# Patient Record
Sex: FEMALE | Race: WHITE | Employment: UNEMPLOYED | URBAN - METROPOLITAN AREA
[De-identification: names, ages, dates, MRNs, and addresses within clinical notes are randomized per-mention and may not be internally consistent; named-entity substitution may affect disease eponyms.]

---

## 2017-01-20 ENCOUNTER — OFFICE VISIT (OUTPATIENT)
Dept: PULMONOLOGY | Age: 52
End: 2017-01-20

## 2017-01-20 VITALS
HEART RATE: 83 BPM | BODY MASS INDEX: 34.66 KG/M2 | HEIGHT: 64 IN | WEIGHT: 203 LBS | OXYGEN SATURATION: 98 % | DIASTOLIC BLOOD PRESSURE: 78 MMHG | RESPIRATION RATE: 16 BRPM | SYSTOLIC BLOOD PRESSURE: 112 MMHG

## 2017-01-20 DIAGNOSIS — J45.30 MILD PERSISTENT ASTHMA WITHOUT COMPLICATION: ICD-10-CM

## 2017-01-20 DIAGNOSIS — J30.89 PERENNIAL ALLERGIC RHINITIS, UNSPECIFIED ALLERGIC RHINITIS TRIGGER: Primary | ICD-10-CM

## 2017-01-20 DIAGNOSIS — R05.3 CHRONIC COUGH: ICD-10-CM

## 2017-01-20 PROCEDURE — 99213 OFFICE O/P EST LOW 20 MIN: CPT | Performed by: INTERNAL MEDICINE

## 2017-02-09 ENCOUNTER — OFFICE VISIT (OUTPATIENT)
Dept: FAMILY MEDICINE CLINIC | Age: 52
End: 2017-02-09

## 2017-02-09 VITALS
DIASTOLIC BLOOD PRESSURE: 68 MMHG | RESPIRATION RATE: 16 BRPM | WEIGHT: 199 LBS | HEART RATE: 88 BPM | BODY MASS INDEX: 34.16 KG/M2 | SYSTOLIC BLOOD PRESSURE: 100 MMHG | TEMPERATURE: 98.6 F

## 2017-02-09 DIAGNOSIS — J01.10 ACUTE FRONTAL SINUSITIS, RECURRENCE NOT SPECIFIED: Primary | ICD-10-CM

## 2017-02-09 DIAGNOSIS — J45.30 MILD PERSISTENT ASTHMA WITHOUT COMPLICATION: ICD-10-CM

## 2017-02-09 DIAGNOSIS — J30.89 PERENNIAL ALLERGIC RHINITIS, UNSPECIFIED ALLERGIC RHINITIS TRIGGER: ICD-10-CM

## 2017-02-09 PROCEDURE — 99214 OFFICE O/P EST MOD 30 MIN: CPT | Performed by: FAMILY MEDICINE

## 2017-02-09 RX ORDER — AMOXICILLIN AND CLAVULANATE POTASSIUM 875; 125 MG/1; MG/1
1 TABLET, FILM COATED ORAL EVERY 12 HOURS
Qty: 20 TABLET | Refills: 0 | Status: SHIPPED | OUTPATIENT
Start: 2017-02-09 | End: 2017-02-19

## 2017-02-09 RX ORDER — FLUTICASONE PROPIONATE 50 MCG
SPRAY, SUSPENSION (ML) NASAL
Qty: 3 BOTTLE | Refills: 3 | Status: SHIPPED | OUTPATIENT
Start: 2017-02-09

## 2017-02-09 RX ORDER — MONTELUKAST SODIUM 10 MG/1
TABLET ORAL
Qty: 90 TABLET | Refills: 3 | Status: SHIPPED | OUTPATIENT
Start: 2017-02-09 | End: 2018-07-11 | Stop reason: SDUPTHER

## 2017-02-13 ENCOUNTER — PATIENT MESSAGE (OUTPATIENT)
Dept: FAMILY MEDICINE CLINIC | Age: 52
End: 2017-02-13

## 2017-02-13 RX ORDER — AZITHROMYCIN 250 MG/1
TABLET, FILM COATED ORAL
Qty: 1 PACKET | Refills: 0 | Status: SHIPPED | OUTPATIENT
Start: 2017-02-13 | End: 2017-02-23

## 2017-04-18 ENCOUNTER — OFFICE VISIT (OUTPATIENT)
Dept: PULMONOLOGY | Age: 52
End: 2017-04-18

## 2017-04-18 VITALS
HEIGHT: 64 IN | HEART RATE: 80 BPM | WEIGHT: 201 LBS | OXYGEN SATURATION: 99 % | RESPIRATION RATE: 16 BRPM | BODY MASS INDEX: 34.31 KG/M2 | DIASTOLIC BLOOD PRESSURE: 82 MMHG | SYSTOLIC BLOOD PRESSURE: 112 MMHG

## 2017-04-18 DIAGNOSIS — J30.89 PERENNIAL ALLERGIC RHINITIS, UNSPECIFIED ALLERGIC RHINITIS TRIGGER: ICD-10-CM

## 2017-04-18 DIAGNOSIS — R05.3 CHRONIC COUGH: Primary | ICD-10-CM

## 2017-04-18 DIAGNOSIS — J45.30 MILD PERSISTENT ASTHMA WITHOUT COMPLICATION: ICD-10-CM

## 2017-04-18 PROCEDURE — 99213 OFFICE O/P EST LOW 20 MIN: CPT | Performed by: INTERNAL MEDICINE

## 2017-04-24 ENCOUNTER — OFFICE VISIT (OUTPATIENT)
Dept: FAMILY MEDICINE CLINIC | Age: 52
End: 2017-04-24

## 2017-04-24 VITALS
OXYGEN SATURATION: 98 % | HEART RATE: 82 BPM | DIASTOLIC BLOOD PRESSURE: 64 MMHG | WEIGHT: 199.8 LBS | BODY MASS INDEX: 34.3 KG/M2 | SYSTOLIC BLOOD PRESSURE: 116 MMHG

## 2017-04-24 DIAGNOSIS — R42 VERTIGO: Primary | ICD-10-CM

## 2017-04-24 PROCEDURE — 99213 OFFICE O/P EST LOW 20 MIN: CPT | Performed by: FAMILY MEDICINE

## 2017-04-24 RX ORDER — MECLIZINE HCL 12.5 MG/1
12.5-25 TABLET ORAL 3 TIMES DAILY PRN
Qty: 30 TABLET | Refills: 1 | Status: SHIPPED | OUTPATIENT
Start: 2017-04-24 | End: 2017-05-04

## 2018-01-04 ENCOUNTER — OFFICE VISIT (OUTPATIENT)
Dept: FAMILY MEDICINE CLINIC | Age: 53
End: 2018-01-04

## 2018-01-04 VITALS
RESPIRATION RATE: 20 BRPM | TEMPERATURE: 98.6 F | DIASTOLIC BLOOD PRESSURE: 68 MMHG | WEIGHT: 195.5 LBS | OXYGEN SATURATION: 98 % | HEART RATE: 81 BPM | BODY MASS INDEX: 33.56 KG/M2 | SYSTOLIC BLOOD PRESSURE: 132 MMHG

## 2018-01-04 DIAGNOSIS — J45.41 MODERATE PERSISTENT ASTHMA WITH EXACERBATION: ICD-10-CM

## 2018-01-04 DIAGNOSIS — R21 RASH: ICD-10-CM

## 2018-01-04 DIAGNOSIS — J01.00 ACUTE MAXILLARY SINUSITIS, RECURRENCE NOT SPECIFIED: Primary | ICD-10-CM

## 2018-01-04 PROCEDURE — 99214 OFFICE O/P EST MOD 30 MIN: CPT | Performed by: FAMILY MEDICINE

## 2018-01-04 RX ORDER — ALBUTEROL SULFATE 90 UG/1
2 AEROSOL, METERED RESPIRATORY (INHALATION) EVERY 6 HOURS PRN
Qty: 1 INHALER | Refills: 3 | Status: SHIPPED | OUTPATIENT
Start: 2018-01-04 | End: 2018-07-10 | Stop reason: SDUPTHER

## 2018-01-04 RX ORDER — AMOXICILLIN AND CLAVULANATE POTASSIUM 875; 125 MG/1; MG/1
1 TABLET, FILM COATED ORAL EVERY 12 HOURS
Qty: 20 TABLET | Refills: 0 | Status: SHIPPED | OUTPATIENT
Start: 2018-01-04 | End: 2018-01-04 | Stop reason: SINTOL

## 2018-01-04 RX ORDER — METHYLPREDNISOLONE 4 MG/1
TABLET ORAL
Qty: 1 KIT | Refills: 0 | Status: SHIPPED | OUTPATIENT
Start: 2018-01-04 | End: 2018-01-10

## 2018-04-10 ENCOUNTER — OFFICE VISIT (OUTPATIENT)
Dept: FAMILY MEDICINE CLINIC | Age: 53
End: 2018-04-10

## 2018-04-10 VITALS
HEART RATE: 94 BPM | OXYGEN SATURATION: 100 % | TEMPERATURE: 98.9 F | WEIGHT: 196.4 LBS | DIASTOLIC BLOOD PRESSURE: 84 MMHG | RESPIRATION RATE: 16 BRPM | SYSTOLIC BLOOD PRESSURE: 128 MMHG | BODY MASS INDEX: 33.71 KG/M2

## 2018-04-10 DIAGNOSIS — J02.9 SORE THROAT: Primary | ICD-10-CM

## 2018-04-10 DIAGNOSIS — Z12.11 COLON CANCER SCREENING: ICD-10-CM

## 2018-04-10 DIAGNOSIS — J01.00 ACUTE NON-RECURRENT MAXILLARY SINUSITIS: ICD-10-CM

## 2018-04-10 LAB — S PYO AG THROAT QL: NORMAL

## 2018-04-10 PROCEDURE — 87880 STREP A ASSAY W/OPTIC: CPT | Performed by: FAMILY MEDICINE

## 2018-04-10 PROCEDURE — 99213 OFFICE O/P EST LOW 20 MIN: CPT | Performed by: FAMILY MEDICINE

## 2018-04-10 RX ORDER — CEFUROXIME AXETIL 500 MG/1
500 TABLET ORAL 2 TIMES DAILY
Qty: 20 TABLET | Refills: 0 | Status: SHIPPED | OUTPATIENT
Start: 2018-04-10 | End: 2018-04-20

## 2018-04-12 ENCOUNTER — PATIENT MESSAGE (OUTPATIENT)
Dept: FAMILY MEDICINE CLINIC | Age: 53
End: 2018-04-12

## 2018-04-12 RX ORDER — DOXYCYCLINE HYCLATE 100 MG/1
100 CAPSULE ORAL 2 TIMES DAILY
Qty: 20 CAPSULE | Refills: 0 | Status: SHIPPED | OUTPATIENT
Start: 2018-04-12 | End: 2018-04-22

## 2018-05-07 ENCOUNTER — OFFICE VISIT (OUTPATIENT)
Dept: FAMILY MEDICINE CLINIC | Age: 53
End: 2018-05-07

## 2018-05-07 VITALS
TEMPERATURE: 98.5 F | DIASTOLIC BLOOD PRESSURE: 58 MMHG | OXYGEN SATURATION: 98 % | BODY MASS INDEX: 32.99 KG/M2 | WEIGHT: 192.2 LBS | SYSTOLIC BLOOD PRESSURE: 121 MMHG | RESPIRATION RATE: 12 BRPM | HEART RATE: 85 BPM

## 2018-05-07 DIAGNOSIS — J30.1 CHRONIC SEASONAL ALLERGIC RHINITIS DUE TO POLLEN: ICD-10-CM

## 2018-05-07 DIAGNOSIS — Z13.1 DIABETES MELLITUS SCREENING: ICD-10-CM

## 2018-05-07 DIAGNOSIS — Z13.220 LIPID SCREENING: ICD-10-CM

## 2018-05-07 DIAGNOSIS — J01.00 ACUTE NON-RECURRENT MAXILLARY SINUSITIS: Primary | ICD-10-CM

## 2018-05-07 DIAGNOSIS — J45.30 MILD PERSISTENT ASTHMA WITHOUT COMPLICATION: ICD-10-CM

## 2018-05-07 DIAGNOSIS — Z13.29 THYROID DISORDER SCREEN: ICD-10-CM

## 2018-05-07 DIAGNOSIS — E55.9 VITAMIN D DEFICIENCY: ICD-10-CM

## 2018-05-07 PROCEDURE — 99214 OFFICE O/P EST MOD 30 MIN: CPT | Performed by: FAMILY MEDICINE

## 2018-05-07 ASSESSMENT — PATIENT HEALTH QUESTIONNAIRE - PHQ9
SUM OF ALL RESPONSES TO PHQ QUESTIONS 1-9: 0
2. FEELING DOWN, DEPRESSED OR HOPELESS: 0
SUM OF ALL RESPONSES TO PHQ9 QUESTIONS 1 & 2: 0
1. LITTLE INTEREST OR PLEASURE IN DOING THINGS: 0

## 2018-05-08 DIAGNOSIS — Z13.220 LIPID SCREENING: ICD-10-CM

## 2018-05-08 DIAGNOSIS — Z13.29 THYROID DISORDER SCREEN: ICD-10-CM

## 2018-05-08 DIAGNOSIS — J30.1 CHRONIC SEASONAL ALLERGIC RHINITIS DUE TO POLLEN: ICD-10-CM

## 2018-05-08 DIAGNOSIS — Z13.1 DIABETES MELLITUS SCREENING: ICD-10-CM

## 2018-05-08 DIAGNOSIS — E55.9 VITAMIN D DEFICIENCY: ICD-10-CM

## 2018-05-08 LAB
A/G RATIO: 1.5 (ref 1.1–2.2)
ALBUMIN SERPL-MCNC: 4.1 G/DL (ref 3.4–5)
ALP BLD-CCNC: 90 U/L (ref 40–129)
ALT SERPL-CCNC: 16 U/L (ref 10–40)
ANION GAP SERPL CALCULATED.3IONS-SCNC: 14 MMOL/L (ref 3–16)
AST SERPL-CCNC: 11 U/L (ref 15–37)
BASOPHILS ABSOLUTE: 0 K/UL (ref 0–0.2)
BASOPHILS RELATIVE PERCENT: 0.3 %
BILIRUB SERPL-MCNC: <0.2 MG/DL (ref 0–1)
BUN BLDV-MCNC: 7 MG/DL (ref 7–20)
CALCIUM SERPL-MCNC: 9.5 MG/DL (ref 8.3–10.6)
CHLORIDE BLD-SCNC: 100 MMOL/L (ref 99–110)
CHOLESTEROL, TOTAL: 167 MG/DL (ref 0–199)
CO2: 24 MMOL/L (ref 21–32)
CREAT SERPL-MCNC: 0.6 MG/DL (ref 0.6–1.1)
EOSINOPHILS ABSOLUTE: 0 K/UL (ref 0–0.6)
EOSINOPHILS RELATIVE PERCENT: 0.5 %
GFR AFRICAN AMERICAN: >60
GFR NON-AFRICAN AMERICAN: >60
GLOBULIN: 2.7 G/DL
GLUCOSE BLD-MCNC: 105 MG/DL (ref 70–99)
HCT VFR BLD CALC: 35.5 % (ref 36–48)
HDLC SERPL-MCNC: 65 MG/DL (ref 40–60)
HEMOGLOBIN: 11.7 G/DL (ref 12–16)
LDL CHOLESTEROL CALCULATED: 80 MG/DL
LYMPHOCYTES ABSOLUTE: 1.9 K/UL (ref 1–5.1)
LYMPHOCYTES RELATIVE PERCENT: 25.3 %
MCH RBC QN AUTO: 26.2 PG (ref 26–34)
MCHC RBC AUTO-ENTMCNC: 33 G/DL (ref 31–36)
MCV RBC AUTO: 79.5 FL (ref 80–100)
MONOCYTES ABSOLUTE: 0.5 K/UL (ref 0–1.3)
MONOCYTES RELATIVE PERCENT: 6.3 %
NEUTROPHILS ABSOLUTE: 5.1 K/UL (ref 1.7–7.7)
NEUTROPHILS RELATIVE PERCENT: 67.6 %
PDW BLD-RTO: 14.8 % (ref 12.4–15.4)
PLATELET # BLD: 351 K/UL (ref 135–450)
PMV BLD AUTO: 8 FL (ref 5–10.5)
POTASSIUM SERPL-SCNC: 4.4 MMOL/L (ref 3.5–5.1)
RBC # BLD: 4.46 M/UL (ref 4–5.2)
SODIUM BLD-SCNC: 138 MMOL/L (ref 136–145)
TOTAL PROTEIN: 6.8 G/DL (ref 6.4–8.2)
TRIGL SERPL-MCNC: 110 MG/DL (ref 0–150)
TSH REFLEX: 0.01 UIU/ML (ref 0.27–4.2)
VITAMIN D 25-HYDROXY: 40.2 NG/ML
VLDLC SERPL CALC-MCNC: 22 MG/DL
WBC # BLD: 7.5 K/UL (ref 4–11)

## 2018-05-09 ENCOUNTER — PATIENT MESSAGE (OUTPATIENT)
Dept: FAMILY MEDICINE CLINIC | Age: 53
End: 2018-05-09

## 2018-05-09 DIAGNOSIS — E05.90 HYPERTHYROIDISM: Primary | ICD-10-CM

## 2018-05-09 LAB — T4 FREE: 2 NG/DL (ref 0.9–1.8)

## 2018-05-21 DIAGNOSIS — E05.90 HYPERTHYROIDISM: ICD-10-CM

## 2018-05-21 LAB
ANTI-THYROGLOB ABS: 559 IU/ML
THYROID PEROXIDASE (TPO) ABS: 20 IU/ML

## 2018-05-22 LAB — T3 FREE: 4.5 PG/ML (ref 2.3–4.2)

## 2018-05-29 ENCOUNTER — OFFICE VISIT (OUTPATIENT)
Dept: FAMILY MEDICINE CLINIC | Age: 53
End: 2018-05-29

## 2018-05-29 VITALS
DIASTOLIC BLOOD PRESSURE: 68 MMHG | BODY MASS INDEX: 32.82 KG/M2 | OXYGEN SATURATION: 99 % | WEIGHT: 191.2 LBS | SYSTOLIC BLOOD PRESSURE: 122 MMHG | TEMPERATURE: 98.3 F | HEART RATE: 79 BPM | RESPIRATION RATE: 20 BRPM

## 2018-05-29 DIAGNOSIS — E55.9 VITAMIN D DEFICIENCY: ICD-10-CM

## 2018-05-29 DIAGNOSIS — E05.90 HYPERTHYROIDISM: Primary | ICD-10-CM

## 2018-05-29 PROCEDURE — 99214 OFFICE O/P EST MOD 30 MIN: CPT | Performed by: FAMILY MEDICINE

## 2018-07-10 ENCOUNTER — OFFICE VISIT (OUTPATIENT)
Dept: ENDOCRINOLOGY | Age: 53
End: 2018-07-10

## 2018-07-10 VITALS
RESPIRATION RATE: 16 BRPM | OXYGEN SATURATION: 100 % | BODY MASS INDEX: 32.5 KG/M2 | SYSTOLIC BLOOD PRESSURE: 112 MMHG | DIASTOLIC BLOOD PRESSURE: 72 MMHG | WEIGHT: 190.4 LBS | HEART RATE: 86 BPM | HEIGHT: 64 IN | TEMPERATURE: 98.4 F

## 2018-07-10 DIAGNOSIS — J45.41 MODERATE PERSISTENT ASTHMA WITH EXACERBATION: ICD-10-CM

## 2018-07-10 DIAGNOSIS — E06.9 THYROIDITIS: ICD-10-CM

## 2018-07-10 DIAGNOSIS — E05.90 HYPERTHYROIDISM: ICD-10-CM

## 2018-07-10 DIAGNOSIS — E05.90 HYPERTHYROIDISM: Primary | ICD-10-CM

## 2018-07-10 DIAGNOSIS — J45.30 MILD PERSISTENT ASTHMA WITHOUT COMPLICATION: ICD-10-CM

## 2018-07-10 DIAGNOSIS — E06.3 HASHIMOTO'S THYROIDITIS: ICD-10-CM

## 2018-07-10 LAB
T3 FREE: 2.8 PG/ML (ref 2.3–4.2)
T4 FREE: 0.9 NG/DL (ref 0.9–1.8)
TSH SERPL DL<=0.05 MIU/L-ACNC: 2.67 UIU/ML (ref 0.27–4.2)

## 2018-07-10 PROCEDURE — 99243 OFF/OP CNSLTJ NEW/EST LOW 30: CPT | Performed by: INTERNAL MEDICINE

## 2018-07-10 RX ORDER — ALBUTEROL SULFATE 90 UG/1
2 AEROSOL, METERED RESPIRATORY (INHALATION) EVERY 6 HOURS PRN
Qty: 1 INHALER | Refills: 3 | Status: SHIPPED | OUTPATIENT
Start: 2018-07-10 | End: 2019-05-31 | Stop reason: SDUPTHER

## 2018-07-10 RX ORDER — MONTELUKAST SODIUM 10 MG/1
TABLET ORAL
Qty: 90 TABLET | Refills: 3 | Status: CANCELLED | OUTPATIENT
Start: 2018-07-10

## 2018-07-10 NOTE — PROGRESS NOTES
Allergic rhinitis     Asthma     Migraine     Vitamin D deficiency      Past Surgical History:   Procedure Laterality Date    BREAST BIOPSY  2012    right    CARPAL TUNNEL RELEASE  3/07    right    EYE SURGERY      X 3     Family History   Problem Relation Age of Onset    Arthritis Mother     High Cholesterol Mother     Diabetes Father     Coronary Art Dis Father 54    Anxiety Disorder Father     Alzheimer's Disease Brother      History   Smoking Status    Never Smoker   Smokeless Tobacco    Never Used      History   Alcohol Use No       HPI    Woo Genre is a 47 y.o. female who is here for initial evaluation for management of hyperthyroidism  Patient is being seen at the request of Tricia Pinto MD    Patient has a PMH of asthma a, allergic rhinitis, vit D def, Migraine headaches. She experienced neck tenderness in February-march , 2018  Had pain on swallowing. Patient was found to have a low TSH ( 0.01 ) and high FT4 (2.0 ) and Free T3 ( 4.5)  on labs done in 05/18    No Weight loss  + Fatigue  No Tremors  No Palpitations  + Heat intolerance + Sweating    Her symptoms have improved. Eye changes : No      FH of thyroid disease : No     Review of system Please see the scanned document filled by the patient, reviewed  by me today. Physical Exam   Constitutional: She is oriented to person, place, and time. She appears well-developed. No distress. HENT:   Mouth/Throat: Oropharynx is clear and moist.   Eyes: EOM are normal.   No lid lag, lid retraction, Proptosis, conjunctival redness, swelling. Neck: Thyromegaly (Diffuse) present. No tenderness   Cardiovascular: Normal rate and normal heart sounds. Pulmonary/Chest: Effort normal. No respiratory distress. She has no wheezes. Abdominal: Soft. Bowel sounds are normal. There is no tenderness. Musculoskeletal: She exhibits no edema. Neurological: She is alert and oriented to person, place, and time.    No tremors   Skin: stable  renal function.  GFR  05/08/2018 >60  >60 Final    Comment: Chronic Kidney Disease: less than 60 ml/min/1.73 sq.m. Kidney Failure: less than 15 ml/min/1.73 sq.m. Results valid for patients 18 years and older.       Calcium 05/08/2018 9.5  8.3 - 10.6 mg/dL Final    Total Protein 05/08/2018 6.8  6.4 - 8.2 g/dL Final    Alb 05/08/2018 4.1  3.4 - 5.0 g/dL Final    Albumin/Globulin Ratio 05/08/2018 1.5  1.1 - 2.2 Final    Total Bilirubin 05/08/2018 <0.2  0.0 - 1.0 mg/dL Final    Alkaline Phosphatase 05/08/2018 90  40 - 129 U/L Final    ALT 05/08/2018 16  10 - 40 U/L Final    AST 05/08/2018 11* 15 - 37 U/L Final    Globulin 05/08/2018 2.7  g/dL Final    Cholesterol, Total 05/08/2018 167  0 - 199 mg/dL Final    Triglycerides 05/08/2018 110  0 - 150 mg/dL Final    HDL 05/08/2018 65* 40 - 60 mg/dL Final    LDL Calculated 05/08/2018 80  <100 mg/dL Final    VLDL Cholesterol Calculated 05/08/2018 22  Not Established mg/dL Final    TSH 05/08/2018 0.01* 0.27 - 4.20 uIU/mL Final    WBC 05/08/2018 7.5  4.0 - 11.0 K/uL Final    RBC 05/08/2018 4.46  4.00 - 5.20 M/uL Final    Hemoglobin 05/08/2018 11.7* 12.0 - 16.0 g/dL Final    Hematocrit 05/08/2018 35.5* 36.0 - 48.0 % Final    MCV 05/08/2018 79.5* 80.0 - 100.0 fL Final    MCH 05/08/2018 26.2  26.0 - 34.0 pg Final    MCHC 05/08/2018 33.0  31.0 - 36.0 g/dL Final    RDW 05/08/2018 14.8  12.4 - 15.4 % Final    Platelets 60/47/6602 351  135 - 450 K/uL Final    MPV 05/08/2018 8.0  5.0 - 10.5 fL Final    Neutrophils % 05/08/2018 67.6  % Final    Lymphocytes % 05/08/2018 25.3  % Final    Monocytes % 05/08/2018 6.3  % Final    Eosinophils % 05/08/2018 0.5  % Final    Basophils % 05/08/2018 0.3  % Final    Neutrophils # 05/08/2018 5.1  1.7 - 7.7 K/uL Final    Lymphocytes # 05/08/2018 1.9  1.0 - 5.1 K/uL Final    Monocytes # 05/08/2018 0.5  0.0 - 1.3 K/uL Final    Eosinophils # 05/08/2018 0.0  0.0 - 0.6 K/uL Final   

## 2018-07-10 NOTE — TELEPHONE ENCOUNTER
From: Liam Darden  Sent: 7/9/2018 11:15 PM EDT  Subject: Medication Renewal Request    Barry Nelson.  Isabella Crigler would like a refill of the following medications:     albuterol sulfate HFA (PROVENTIL HFA) 108 (90 Base) MCG/ACT inhaler Yolette Ross MD]   Patient Comment: only 1     Preferred pharmacy: [address] [place] [address] [place] [phone]

## 2018-07-11 DIAGNOSIS — J45.30 MILD PERSISTENT ASTHMA WITHOUT COMPLICATION: ICD-10-CM

## 2018-07-11 DIAGNOSIS — E06.3 HASHIMOTO'S THYROIDITIS: Primary | ICD-10-CM

## 2018-07-11 RX ORDER — MONTELUKAST SODIUM 10 MG/1
TABLET ORAL
Qty: 90 TABLET | Refills: 3 | Status: SHIPPED | OUTPATIENT
Start: 2018-07-11 | End: 2018-07-25 | Stop reason: SDUPTHER

## 2018-07-25 DIAGNOSIS — J45.30 MILD PERSISTENT ASTHMA WITHOUT COMPLICATION: ICD-10-CM

## 2018-07-25 RX ORDER — MONTELUKAST SODIUM 10 MG/1
TABLET ORAL
Qty: 90 TABLET | Refills: 2 | Status: SHIPPED | OUTPATIENT
Start: 2018-07-25 | End: 2018-11-05 | Stop reason: SDUPTHER

## 2018-08-27 ENCOUNTER — OFFICE VISIT (OUTPATIENT)
Dept: FAMILY MEDICINE CLINIC | Age: 53
End: 2018-08-27

## 2018-08-27 VITALS
RESPIRATION RATE: 12 BRPM | DIASTOLIC BLOOD PRESSURE: 65 MMHG | HEART RATE: 78 BPM | WEIGHT: 192 LBS | HEIGHT: 64 IN | SYSTOLIC BLOOD PRESSURE: 123 MMHG | TEMPERATURE: 98.2 F | BODY MASS INDEX: 32.78 KG/M2 | OXYGEN SATURATION: 99 %

## 2018-08-27 DIAGNOSIS — M19.041 ARTHRITIS OF FINGER OF BOTH HANDS: ICD-10-CM

## 2018-08-27 DIAGNOSIS — M19.042 ARTHRITIS OF FINGER OF BOTH HANDS: ICD-10-CM

## 2018-08-27 DIAGNOSIS — M19.042 ARTHRITIS OF FINGER OF BOTH HANDS: Primary | ICD-10-CM

## 2018-08-27 DIAGNOSIS — M19.041 ARTHRITIS OF FINGER OF BOTH HANDS: Primary | ICD-10-CM

## 2018-08-27 LAB
BASOPHILS ABSOLUTE: 0.1 K/UL (ref 0–0.2)
BASOPHILS RELATIVE PERCENT: 0.9 %
EOSINOPHILS ABSOLUTE: 0.1 K/UL (ref 0–0.6)
EOSINOPHILS RELATIVE PERCENT: 1.3 %
HCT VFR BLD CALC: 37.5 % (ref 36–48)
HEMOGLOBIN: 12 G/DL (ref 12–16)
LYMPHOCYTES ABSOLUTE: 2.1 K/UL (ref 1–5.1)
LYMPHOCYTES RELATIVE PERCENT: 34 %
MCH RBC QN AUTO: 25.9 PG (ref 26–34)
MCHC RBC AUTO-ENTMCNC: 32.1 G/DL (ref 31–36)
MCV RBC AUTO: 80.9 FL (ref 80–100)
MONOCYTES ABSOLUTE: 0.4 K/UL (ref 0–1.3)
MONOCYTES RELATIVE PERCENT: 7.2 %
NEUTROPHILS ABSOLUTE: 3.4 K/UL (ref 1.7–7.7)
NEUTROPHILS RELATIVE PERCENT: 56.6 %
PDW BLD-RTO: 16.7 % (ref 12.4–15.4)
PLATELET # BLD: 379 K/UL (ref 135–450)
PMV BLD AUTO: 7.7 FL (ref 5–10.5)
RBC # BLD: 4.64 M/UL (ref 4–5.2)
WBC # BLD: 6.1 K/UL (ref 4–11)

## 2018-08-27 PROCEDURE — 99213 OFFICE O/P EST LOW 20 MIN: CPT | Performed by: FAMILY MEDICINE

## 2018-08-27 NOTE — PATIENT INSTRUCTIONS
exercises. Ask them not to disturb you. Find a comfortable, quiet place. Lie down on your back, or sit with your back straight. Focus on your breathing. Make it slow and steady. Breathe in through your nose. Breathe out through either your nose or mouth. Breathe deeply, filling up the area between your navel and your rib cage. Breathe so that your belly goes up and down. Do not hold your breath. Breathe like this for 5 to 10 minutes. Notice the feeling of calmness throughout your whole body. As you continue to breathe slowly and deeply, relax by doing these next steps for another 5 to 10 minutes:  Tighten and relax each muscle group in your body. Start at your toes, and work your way up to your head. Imagine your muscle groups relaxing and getting heavy. Empty your mind of all thoughts. Let yourself relax more and more deeply. Be aware of the state of calmness that surrounds you. When your relaxation time is over, you can bring yourself back to alertness by moving your fingers and toes. Then move your hands and feet. And then move your entire body. Sometimes people fall asleep during relaxation. But they most often wake up soon. Always give yourself time to return to full alertness before you drive a car. Wait to do anything that might cause an accident if you are not fully alert. Never play a relaxation tape while you drive a car. When should you call for help? Call 911 anytime you think you may need emergency care. For example, call if:    You feel you cannot stop from hurting yourself or someone else. Keep the numbers for these national suicide hotlines: 9-695-062-TALK (4-752.479.7785) and 3-007-KQOICUE (1-572.802.5537).  If you or someone you know talks about suicide or feeling hopeless, get help right away.    Watch closely for changes in your health, and be sure to contact your doctor if:    You have new anxiety, or your anxiety gets worse.     You have been feeling sad, depressed, or hopeless or have lost interest in things that you usually enjoy.     You do not get better as expected. Where can you learn more? Go to https://chpepiceweb.[employer]. org and sign in to your [employer] account. Enter [id number] [id number] in the [facility] box to learn more about \"Adjustment Disorder: Care Instructions. \"     If you do not have an account, please click on the \"Sign Up Now\" link. Current as of: October 10, 2017  Content Version: 11.7  © 0712-4600 [employer]. Care instructions adapted under license by [facility] ([facility]). If you have questions about a medical condition or this instruction, always ask your healthcare professional. [place] [address] any warranty or liability for your use of this information. Anxiety Disorder: Care Instructions  Your Care Instructions    Anxiety is a normal reaction to stress. Difficult situations can cause you to have symptoms such as sweaty palms and a nervous feeling. In an anxiety disorder, the symptoms are far more severe. Constant worry, muscle tension, trouble sleeping, nausea and diarrhea, and other symptoms can make normal daily activities difficult or impossible. These symptoms may occur for no reason, and they can affect your work, school, or social life. Medicines, counseling, and self-care can all help. Follow-up care is a key part of your treatment and safety. Be sure to make and go to all appointments, and call your doctor if you are having problems. It's also a good idea to know your test results and keep a list of the medicines you take. How can you care for yourself at home? · Take medicines exactly as directed. Call your doctor if you think you are having a problem with your medicine. · Go to your counseling sessions and follow-up appointments. · Recognize and accept your anxiety. Then, when you are in a situation that makes you anxious, say to yourself, \"This is not an emergency.  I feel uncomfortable, but I am not in danger. I can keep going even if I feel anxious. \"  · Be kind to your body:  ¨ Relieve tension with exercise or a massage. ¨ Get enough rest.  ¨ Avoid alcohol, caffeine, nicotine, and illegal drugs. They can increase your anxiety level and cause sleep problems. ¨ Learn and do relaxation techniques. See below for more about these techniques. · Engage your mind. Get out and do something you enjoy. Go to a funny movie, or take a walk or hike. Plan your day. Having too much or too little to do can make you anxious. · Keep a record of your symptoms. Discuss your fears with a good friend or family member, or join a support group for people with similar problems. Talking to others sometimes relieves stress. · Get involved in social groups, or volunteer to help others. Being alone sometimes makes things seem worse than they are. · Get at least 30 minutes of exercise on most days of the week to relieve stress. Walking is a good choice. You also may want to do other activities, such as running, swimming, cycling, or playing tennis or team sports. Relaxation techniques  Do relaxation exercises 10 to 20 minutes a day. You can play soothing, relaxing music while you do them, if you wish. · Tell others in your house that you are going to do your relaxation exercises. Ask them not to disturb you. · Find a comfortable place, away from all distractions and noise. · Lie down on your back, or sit with your back straight. · Focus on your breathing. Make it slow and steady. · Breathe in through your nose. Breathe out through either your nose or mouth. · Breathe deeply, filling up the area between your navel and your rib cage. Breathe so that your belly goes up and down. · Do not hold your breath. · Breathe like this for 5 to 10 minutes. Notice the feeling of calmness throughout your whole body.   As you continue to breathe slowly and deeply, relax by doing the following for another 5 to 10 minutes:  · Tighten and relax each

## 2018-08-27 NOTE — PROGRESS NOTES
Subjective:      Patient ID: Myrna Garcia 47 y.o. female. is here for evaluation for hand pain      HPI    Lora Aguilar complains of finger pain for the past 3 or so months. Mostly middle fingers both hands PIP. Less so in the [address] urts to , open a bottle. The joints are swollen and stiff. Feels stiff when gets up in the AM, stiff all over. The all over stiffness gets better in about 30 minutes but the hands feel stiff all day.  mildly weak. No rash, change in mild dry eyes, diarrhea, bloody stools, dysphagia. Tendonitis right elbow has flared. Has HEP and Voltaren gel - helping. Rx: occ Advil 200 mg BID helps. Can't take for more than a few days; causes diarrhea. FH: mom has rheumatoid.         Outpatient Prescriptions Marked as Taking for the 8/27/18 encounter (Office Visit) with Indio Berry MD   Medication Sig Dispense Refill    montelukast (SINGULAIR) 10 MG tablet TAKE 1 TABLET EVERY NIGHT ( DUE FOR APPOINTMENT) 90 tablet 2    albuterol sulfate HFA (PROVENTIL HFA) 108 (90 Base) MCG/ACT inhaler Inhale 2 puffs into the lungs every 6 hours as needed for Wheezing 1 Inhaler 3    ADVAIR DISKUS 250-50 MCG/DOSE AEPB USE 1 INHALATION EVERY 12 HOURS 180 each 3    Omega-3 Fatty Acids (FISH OIL PO) Take by mouth      fluticasone (FLONASE) 50 MCG/ACT nasal spray USE 2 SPRAYS NASALLY EVERY DAY 3 Bottle 3    fexofenadine (ALLEGRA ALLERGY) 60 MG tablet Take 1 tablet by mouth daily 1 tablet 2    omeprazole (PRILOSEC) 40 MG delayed release capsule Take 1 capsule by mouth daily 30 capsule 3    vitamin D (CHOLECALCIFEROL) 1000 UNIT TABS tablet Take 2,000 Units by mouth daily       norgestimate-ethinyl estradiol (SPRINTEC 28) 0.25-35 MG-MCG per tablet Take 1 tablet by mouth daily      Probiotic Product ([address]) CAPS Take 1 capsule by mouth daily 30 capsule 12        Allergies   Allergen Reactions    Augmentin [Amoxicillin-Pot Clavulanate] Diarrhea    Bactrim Rash       Patient Active Factor    CBC Auto Differential    C-Reactive Protein          Plan:      Continue PRN Voltaren. Call or return to clinic prn if these symptoms worsen or fail to improve as anticipated.         Daughter has anxiety - requests information

## 2018-08-28 LAB
C-REACTIVE PROTEIN: 8.9 MG/L (ref 0–5.1)
RHEUMATOID FACTOR: 11 IU/ML

## 2018-08-29 LAB — CCP IGG ANTIBODIES: 4 UNITS (ref 0–19)

## 2018-10-01 DIAGNOSIS — J45.30 MILD PERSISTENT ASTHMA WITHOUT COMPLICATION: ICD-10-CM

## 2018-10-08 ENCOUNTER — OFFICE VISIT (OUTPATIENT)
Dept: FAMILY MEDICINE CLINIC | Age: 53
End: 2018-10-08
Payer: COMMERCIAL

## 2018-10-08 VITALS
DIASTOLIC BLOOD PRESSURE: 61 MMHG | TEMPERATURE: 98.6 F | OXYGEN SATURATION: 98 % | WEIGHT: 194.6 LBS | RESPIRATION RATE: 12 BRPM | SYSTOLIC BLOOD PRESSURE: 133 MMHG | HEART RATE: 82 BPM | BODY MASS INDEX: 33.93 KG/M2

## 2018-10-08 DIAGNOSIS — J20.9 BRONCHITIS WITH BRONCHOSPASM: Primary | ICD-10-CM

## 2018-10-08 DIAGNOSIS — J45.41 MODERATE PERSISTENT ASTHMA WITH ACUTE EXACERBATION: ICD-10-CM

## 2018-10-08 PROCEDURE — 99213 OFFICE O/P EST LOW 20 MIN: CPT | Performed by: FAMILY MEDICINE

## 2018-10-08 RX ORDER — AZITHROMYCIN 250 MG/1
TABLET, FILM COATED ORAL
Qty: 1 PACKET | Refills: 0 | Status: SHIPPED | OUTPATIENT
Start: 2018-10-08 | End: 2018-10-18

## 2018-10-08 NOTE — PROGRESS NOTES
Subjective:      Patient ID: Brooke Wilder 47 y.o. female. is here for evaluation for URI      HPI    2 to 3 days cough and congestion. Cough is productive yellow to green mucous. + wheezing - has been using her Albuterol once a day for the past week. Mild chest heaviness. Nasal discharge is clear.  + sinus pressure and mild ST. Pressure in the ears. Balance is off.  + malaise, decreased appetite. Feels feverish but no fever. No nausea, vomiting, diarrhea   Rx: Albuterol HFA, Advair, Advil Cold and Sinus helps.      Outpatient Prescriptions Marked as Taking for the 10/8/18 encounter (Office Visit) with Darrick Joshua MD   Medication Sig Dispense Refill    fluticasone-salmeterol (ADVAIR DISKUS) 250-50 MCG/DOSE AEPB Inhale 1 puff into the lungs 2 times daily 1 Inhaler 10    montelukast (SINGULAIR) 10 MG tablet TAKE 1 TABLET EVERY NIGHT ( DUE FOR APPOINTMENT) 90 tablet 2    albuterol sulfate HFA (PROVENTIL HFA) 108 (90 Base) MCG/ACT inhaler Inhale 2 puffs into the lungs every 6 hours as needed for Wheezing 1 Inhaler 3    Omega-3 Fatty Acids (FISH OIL PO) Take by mouth      fluticasone (FLONASE) 50 MCG/ACT nasal spray USE 2 SPRAYS NASALLY EVERY DAY 3 Bottle 3    fexofenadine (ALLEGRA ALLERGY) 60 MG tablet Take 1 tablet by mouth daily 1 tablet 2    omeprazole (PRILOSEC) 40 MG delayed release capsule Take 1 capsule by mouth daily 30 capsule 3    vitamin D (CHOLECALCIFEROL) 1000 UNIT TABS tablet Take 2,000 Units by mouth daily       norgestimate-ethinyl estradiol (SPRINTEC 28) 0.25-35 MG-MCG per tablet Take 1 tablet by mouth daily      Probiotic Product ([address]) CAPS Take 1 capsule by mouth daily 30 capsule 12        Allergies   Allergen Reactions    Augmentin [Amoxicillin-Pot Clavulanate] Diarrhea    Bactrim Rash       Patient Active Problem List   Diagnosis    Asthma    Shortness of breath    Allergic rhinitis    Migraine    Vitamin D deficiency    Tibialis posterior tendinitis

## 2018-10-30 PROBLEM — S92.351D CLOSED DISPLACED FRACTURE OF FIFTH METATARSAL BONE OF RIGHT FOOT WITH ROUTINE HEALING: Status: ACTIVE | Noted: 2018-10-30

## 2018-11-05 ENCOUNTER — OFFICE VISIT (OUTPATIENT)
Dept: FAMILY MEDICINE CLINIC | Age: 53
End: 2018-11-05
Payer: COMMERCIAL

## 2018-11-05 VITALS
TEMPERATURE: 98.3 F | BODY MASS INDEX: 33.12 KG/M2 | SYSTOLIC BLOOD PRESSURE: 119 MMHG | HEART RATE: 77 BPM | RESPIRATION RATE: 14 BRPM | WEIGHT: 194 LBS | HEIGHT: 64 IN | DIASTOLIC BLOOD PRESSURE: 62 MMHG | OXYGEN SATURATION: 99 %

## 2018-11-05 DIAGNOSIS — S93.401A SPRAIN OF RIGHT ANKLE, UNSPECIFIED LIGAMENT, INITIAL ENCOUNTER: ICD-10-CM

## 2018-11-05 DIAGNOSIS — Z23 NEED FOR INFLUENZA VACCINATION: ICD-10-CM

## 2018-11-05 DIAGNOSIS — J45.30 MILD PERSISTENT ASTHMA WITHOUT COMPLICATION: ICD-10-CM

## 2018-11-05 DIAGNOSIS — Z12.11 COLON CANCER SCREENING: ICD-10-CM

## 2018-11-05 DIAGNOSIS — Z23 NEED FOR PNEUMOCOCCAL VACCINATION: ICD-10-CM

## 2018-11-05 DIAGNOSIS — S92.354A CLOSED NONDISPLACED FRACTURE OF FIFTH METATARSAL BONE OF RIGHT FOOT, INITIAL ENCOUNTER: Primary | ICD-10-CM

## 2018-11-05 PROBLEM — R05.3 CHRONIC COUGH: Status: RESOLVED | Noted: 2017-01-20 | Resolved: 2018-11-05

## 2018-11-05 PROCEDURE — 99214 OFFICE O/P EST MOD 30 MIN: CPT | Performed by: FAMILY MEDICINE

## 2018-11-05 PROCEDURE — 90471 IMMUNIZATION ADMIN: CPT | Performed by: FAMILY MEDICINE

## 2018-11-05 PROCEDURE — 90686 IIV4 VACC NO PRSV 0.5 ML IM: CPT | Performed by: FAMILY MEDICINE

## 2018-11-05 RX ORDER — MONTELUKAST SODIUM 10 MG/1
TABLET ORAL
Qty: 90 TABLET | Refills: 3 | Status: SHIPPED | OUTPATIENT
Start: 2018-11-05 | End: 2019-01-11 | Stop reason: SDUPTHER

## 2018-11-05 RX ORDER — ALBUTEROL SULFATE 90 UG/1
2 AEROSOL, METERED RESPIRATORY (INHALATION) EVERY 6 HOURS PRN
Qty: 1 INHALER | Refills: 3 | Status: CANCELLED | OUTPATIENT
Start: 2018-11-05

## 2018-11-05 RX ORDER — HYDROCODONE BITARTRATE AND ACETAMINOPHEN 5; 325 MG/1; MG/1
TABLET ORAL
Refills: 0 | COMMUNITY
Start: 2018-10-29 | End: 2018-11-29 | Stop reason: ALTCHOICE

## 2018-11-29 ENCOUNTER — OFFICE VISIT (OUTPATIENT)
Dept: ENDOCRINOLOGY | Age: 53
End: 2018-11-29
Payer: COMMERCIAL

## 2018-11-29 VITALS
HEART RATE: 91 BPM | HEIGHT: 64 IN | SYSTOLIC BLOOD PRESSURE: 124 MMHG | OXYGEN SATURATION: 96 % | WEIGHT: 190 LBS | DIASTOLIC BLOOD PRESSURE: 69 MMHG | RESPIRATION RATE: 18 BRPM | BODY MASS INDEX: 32.44 KG/M2

## 2018-11-29 DIAGNOSIS — E06.9 THYROIDITIS: ICD-10-CM

## 2018-11-29 DIAGNOSIS — E06.3 HASHIMOTO'S THYROIDITIS: Primary | ICD-10-CM

## 2018-11-29 DIAGNOSIS — E06.3 HASHIMOTO'S THYROIDITIS: ICD-10-CM

## 2018-11-29 LAB
T3 FREE: 3.1 PG/ML (ref 2.3–4.2)
T4 FREE: 1.1 NG/DL (ref 0.9–1.8)
TSH SERPL DL<=0.05 MIU/L-ACNC: 2.29 UIU/ML (ref 0.27–4.2)

## 2018-11-29 PROCEDURE — 99213 OFFICE O/P EST LOW 20 MIN: CPT | Performed by: INTERNAL MEDICINE

## 2018-11-29 ASSESSMENT — ENCOUNTER SYMPTOMS
COUGH: 0
PHOTOPHOBIA: 0
BACK PAIN: 0

## 2018-11-29 NOTE — PROGRESS NOTES
Endocrinology  Joseline Tesfaye M.D. Phone: [phone]   FAX: [phone]       Bessy Rubalcava   YOB: 1965    Date of Visit:  11/29/2018    Allergies   Allergen Reactions    Hydrocodone Other (See Comments)     Ringing in ears, migraine headache    Augmentin [Amoxicillin-Pot Clavulanate] Diarrhea    Bactrim Rash     Outpatient Prescriptions Marked as Taking for the 11/29/18 encounter (Office Visit) with Ki Limon MD   Medication Sig Dispense Refill    montelukast (SINGULAIR) 10 MG tablet TAKE 1 TABLET EVERY NIGHT ( DUE FOR APPOINTMENT) 90 tablet 3    fluticasone-salmeterol (ADVAIR DISKUS) 250-50 MCG/DOSE AEPB Inhale 1 puff into the lungs 2 times daily 1 Inhaler 10    albuterol sulfate HFA (PROVENTIL HFA) 108 (90 Base) MCG/ACT inhaler Inhale 2 puffs into the lungs every 6 hours as needed for Wheezing 1 Inhaler 3    Omega-3 Fatty Acids (FISH OIL PO) Take by mouth      fluticasone (FLONASE) 50 MCG/ACT nasal spray USE 2 SPRAYS NASALLY EVERY DAY 3 Bottle 3    fexofenadine (ALLEGRA ALLERGY) 60 MG tablet Take 1 tablet by mouth daily 1 tablet 2    omeprazole (PRILOSEC) 40 MG delayed release capsule Take 1 capsule by mouth daily 30 capsule 3    vitamin D (CHOLECALCIFEROL) 1000 UNIT TABS tablet Take 2,000 Units by mouth daily       norgestimate-ethinyl estradiol (SPRINTEC 28) 0.25-35 MG-MCG per tablet Take 1 tablet by mouth daily      Probiotic Product ([address]) CAPS Take 1 capsule by mouth daily 30 capsule 12         Vitals:    11/29/18 1033   BP: 124/69   Site: Left Upper Arm   Position: Sitting   Cuff Size: Large Adult   Pulse: 91   Resp: 18   SpO2: 96%   Weight: 190 lb (86.2 kg)   Height: 5' 3.5\" (1.613 m)     Body mass index is 33.13 kg/m².      Wt Readings from Last 3 Encounters:   11/29/18 190 lb (86.2 kg)   11/05/18 194 lb (88 kg)   10/08/18 194 lb 9.6 oz (88.3 kg)     BP Readings from Last 3 Encounters:   11/29/18 124/69   11/05/18 119/62   10/08/18 133/61 08/27/2018   Component Date Value Ref Range Status    CCP IgG Antibodies 08/27/2018 4  0 - 19 Units Final    Comment: INTERPRETIVE INFORMATION: Cyclic Citrullinated Peptide Antibody, IgG    19 Units or less . .................. Negative    20-39 Units . ....................... Weak Positive    40-59 Units . ...................... Bernadette Gobble Moderate Positive    60 Units or greater . ............... Strong Positive  Anti-cyclic citrullinated peptide (anti-CCP), IgG antibodies are  present in  about 69-83 percent of patients with rheumatoid arthritis (RA) and have  specificities of 93-95 percent. These autoantibodies may be present in  the  preclinical phase of disease, are associated with future RA  development, and  may predict radiographic joint destruction. Patients with weak positive  results should be monitored and testing repeated. Performed by [facility] [place] , [address] [facility] [phone]  www. Marlene Denise MD - Lab.  Director      Rheumatoid Factor 08/27/2018 11.0  <14 IU/mL Final    WBC 08/27/2018 6.1  4.0 - 11.0 K/uL Final    RBC 08/27/2018 4.64  4.00 - 5.20 M/uL Final    Hemoglobin 08/27/2018 12.0  12.0 - 16.0 g/dL Final    Hematocrit 08/27/2018 37.5  36.0 - 48.0 % Final    MCV 08/27/2018 80.9  80.0 - 100.0 fL Final    MCH 08/27/2018 25.9* 26.0 - 34.0 pg Final    MCHC 08/27/2018 32.1  31.0 - 36.0 g/dL Final    RDW 08/27/2018 16.7* 12.4 - 15.4 % Final    Platelets 45/57/9540 379  135 - 450 K/uL Final    MPV 08/27/2018 7.7  5.0 - 10.5 fL Final    Neutrophils % 08/27/2018 56.6  % Final    Lymphocytes % 08/27/2018 34.0  % Final    Monocytes % 08/27/2018 7.2  % Final    Eosinophils % 08/27/2018 1.3  % Final    Basophils % 08/27/2018 0.9  % Final    Neutrophils # 08/27/2018 3.4  1.7 - 7.7 K/uL Final    Lymphocytes # 08/27/2018 2.1  1.0 - 5.1 K/uL Final    Monocytes # 08/27/2018 0.4  0.0 - 1.3 K/uL Final    Eosinophils # 08/27/2018 0.1  0.0 - 0.6 K/uL Final    Basophils #

## 2019-01-08 ENCOUNTER — PATIENT MESSAGE (OUTPATIENT)
Dept: FAMILY MEDICINE CLINIC | Age: 54
End: 2019-01-08

## 2019-01-08 DIAGNOSIS — E06.3 HASHIMOTO'S THYROIDITIS: Primary | ICD-10-CM

## 2019-01-11 ENCOUNTER — OFFICE VISIT (OUTPATIENT)
Dept: FAMILY MEDICINE CLINIC | Age: 54
End: 2019-01-11
Payer: COMMERCIAL

## 2019-01-11 VITALS
DIASTOLIC BLOOD PRESSURE: 62 MMHG | OXYGEN SATURATION: 98 % | SYSTOLIC BLOOD PRESSURE: 134 MMHG | HEART RATE: 82 BPM | WEIGHT: 196.6 LBS | TEMPERATURE: 98.5 F | RESPIRATION RATE: 82 BRPM | BODY MASS INDEX: 34.28 KG/M2

## 2019-01-11 DIAGNOSIS — R00.2 PALPITATIONS: Primary | ICD-10-CM

## 2019-01-11 DIAGNOSIS — J45.30 MILD PERSISTENT ASTHMA WITHOUT COMPLICATION: ICD-10-CM

## 2019-01-11 PROCEDURE — 99214 OFFICE O/P EST MOD 30 MIN: CPT | Performed by: FAMILY MEDICINE

## 2019-01-11 RX ORDER — MONTELUKAST SODIUM 10 MG/1
TABLET ORAL
Qty: 90 TABLET | Refills: 1 | Status: SHIPPED | OUTPATIENT
Start: 2019-01-11 | End: 2019-07-18

## 2019-01-24 LAB
LEFT VENTRICULAR EJECTION FRACTION HIGH VALUE: NORMAL %
LEFT VENTRICULAR EJECTION FRACTION MODE: NORMAL
LV EF: NORMAL %

## 2019-01-25 DIAGNOSIS — R00.2 PALPITATIONS: ICD-10-CM

## 2019-02-28 ENCOUNTER — OFFICE VISIT (OUTPATIENT)
Dept: FAMILY MEDICINE CLINIC | Age: 54
End: 2019-02-28
Payer: COMMERCIAL

## 2019-02-28 VITALS
DIASTOLIC BLOOD PRESSURE: 72 MMHG | BODY MASS INDEX: 34.35 KG/M2 | HEART RATE: 79 BPM | OXYGEN SATURATION: 99 % | TEMPERATURE: 98.4 F | SYSTOLIC BLOOD PRESSURE: 127 MMHG | WEIGHT: 197 LBS

## 2019-02-28 DIAGNOSIS — J02.9 SORE THROAT: ICD-10-CM

## 2019-02-28 DIAGNOSIS — J01.00 ACUTE NON-RECURRENT MAXILLARY SINUSITIS: Primary | ICD-10-CM

## 2019-02-28 LAB — S PYO AG THROAT QL: NORMAL

## 2019-02-28 PROCEDURE — 99213 OFFICE O/P EST LOW 20 MIN: CPT | Performed by: FAMILY MEDICINE

## 2019-02-28 PROCEDURE — 87880 STREP A ASSAY W/OPTIC: CPT | Performed by: FAMILY MEDICINE

## 2019-02-28 RX ORDER — BENZONATATE 200 MG/1
200 CAPSULE ORAL 3 TIMES DAILY PRN
Qty: 30 CAPSULE | Refills: 0 | Status: SHIPPED | OUTPATIENT
Start: 2019-02-28 | End: 2019-03-07

## 2019-02-28 RX ORDER — CEFDINIR 300 MG/1
300 CAPSULE ORAL 2 TIMES DAILY
Qty: 20 CAPSULE | Refills: 0 | Status: SHIPPED | OUTPATIENT
Start: 2019-02-28 | End: 2019-03-10

## 2019-03-03 ENCOUNTER — PATIENT MESSAGE (OUTPATIENT)
Dept: FAMILY MEDICINE CLINIC | Age: 54
End: 2019-03-03

## 2019-03-04 RX ORDER — AZITHROMYCIN 250 MG/1
TABLET, FILM COATED ORAL
Qty: 1 PACKET | Refills: 0 | Status: SHIPPED | OUTPATIENT
Start: 2019-03-04 | End: 2019-03-14

## 2019-03-05 ENCOUNTER — OFFICE VISIT (OUTPATIENT)
Dept: ENDOCRINOLOGY | Age: 54
End: 2019-03-05
Payer: COMMERCIAL

## 2019-03-05 VITALS
HEIGHT: 64 IN | WEIGHT: 199.2 LBS | SYSTOLIC BLOOD PRESSURE: 110 MMHG | DIASTOLIC BLOOD PRESSURE: 78 MMHG | BODY MASS INDEX: 34.01 KG/M2 | OXYGEN SATURATION: 98 % | HEART RATE: 93 BPM | RESPIRATION RATE: 16 BRPM

## 2019-03-05 DIAGNOSIS — E06.9 THYROIDITIS: ICD-10-CM

## 2019-03-05 DIAGNOSIS — E06.3 HASHIMOTO'S THYROIDITIS: Primary | ICD-10-CM

## 2019-03-05 DIAGNOSIS — E01.0 THYROMEGALY: ICD-10-CM

## 2019-03-05 PROCEDURE — 99213 OFFICE O/P EST LOW 20 MIN: CPT | Performed by: INTERNAL MEDICINE

## 2019-03-05 ASSESSMENT — ENCOUNTER SYMPTOMS
COUGH: 0
PHOTOPHOBIA: 0
BACK PAIN: 0

## 2019-04-15 DIAGNOSIS — E06.9 THYROIDITIS: ICD-10-CM

## 2019-04-15 DIAGNOSIS — E06.3 HASHIMOTO'S THYROIDITIS: ICD-10-CM

## 2019-04-15 LAB
T4 FREE: 1.2 NG/DL (ref 0.9–1.8)
TSH SERPL DL<=0.05 MIU/L-ACNC: 1.97 UIU/ML (ref 0.27–4.2)

## 2019-05-03 ENCOUNTER — TELEPHONE (OUTPATIENT)
Dept: FAMILY MEDICINE CLINIC | Age: 54
End: 2019-05-03

## 2019-05-03 RX ORDER — METOPROLOL SUCCINATE 25 MG/1
25 TABLET, EXTENDED RELEASE ORAL DAILY
Qty: 30 TABLET | Refills: 2 | Status: SHIPPED | OUTPATIENT
Start: 2019-05-03 | End: 2019-05-06 | Stop reason: SINTOL

## 2019-05-03 NOTE — TELEPHONE ENCOUNTER
I am going to order Toprol, a beta blocker, to stop the rapid heart rate. Take once a day. Follow up with me or Dr. Adrian Andrews next week.

## 2019-05-06 ENCOUNTER — OFFICE VISIT (OUTPATIENT)
Dept: FAMILY MEDICINE CLINIC | Age: 54
End: 2019-05-06
Payer: COMMERCIAL

## 2019-05-06 VITALS
HEART RATE: 81 BPM | WEIGHT: 199.2 LBS | OXYGEN SATURATION: 99 % | TEMPERATURE: 98.4 F | RESPIRATION RATE: 12 BRPM | BODY MASS INDEX: 34.73 KG/M2 | DIASTOLIC BLOOD PRESSURE: 72 MMHG | SYSTOLIC BLOOD PRESSURE: 105 MMHG

## 2019-05-06 DIAGNOSIS — R00.0 TACHYCARDIA: ICD-10-CM

## 2019-05-06 DIAGNOSIS — E05.90 HYPERTHYROIDISM: ICD-10-CM

## 2019-05-06 DIAGNOSIS — E06.3 HASHIMOTO'S THYROIDITIS: Primary | ICD-10-CM

## 2019-05-06 PROBLEM — S92.354A CLOSED NONDISPLACED FRACTURE OF FIFTH RIGHT METATARSAL BONE: Status: RESOLVED | Noted: 2018-10-30 | Resolved: 2019-05-06

## 2019-05-06 PROBLEM — E06.9 THYROIDITIS: Status: RESOLVED | Noted: 2018-11-29 | Resolved: 2019-05-06

## 2019-05-06 LAB
ANION GAP SERPL CALCULATED.3IONS-SCNC: 13 MMOL/L (ref 3–16)
BUN BLDV-MCNC: 9 MG/DL (ref 7–20)
CALCIUM SERPL-MCNC: 10.2 MG/DL (ref 8.3–10.6)
CHLORIDE BLD-SCNC: 103 MMOL/L (ref 99–110)
CO2: 26 MMOL/L (ref 21–32)
CREAT SERPL-MCNC: 0.8 MG/DL (ref 0.6–1.1)
GFR AFRICAN AMERICAN: >60
GFR NON-AFRICAN AMERICAN: >60
GLUCOSE BLD-MCNC: 111 MG/DL (ref 70–99)
POTASSIUM SERPL-SCNC: 4.6 MMOL/L (ref 3.5–5.1)
SODIUM BLD-SCNC: 142 MMOL/L (ref 136–145)
T3 FREE: 3 PG/ML (ref 2.3–4.2)
T4 FREE: 1.2 NG/DL (ref 0.9–1.8)
TSH SERPL DL<=0.05 MIU/L-ACNC: 2.8 UIU/ML (ref 0.27–4.2)

## 2019-05-06 PROCEDURE — 93000 ELECTROCARDIOGRAM COMPLETE: CPT | Performed by: FAMILY MEDICINE

## 2019-05-06 PROCEDURE — 99214 OFFICE O/P EST MOD 30 MIN: CPT | Performed by: FAMILY MEDICINE

## 2019-05-06 RX ORDER — METOPROLOL SUCCINATE 25 MG/1
25 TABLET, EXTENDED RELEASE ORAL DAILY
Qty: 30 TABLET | Refills: 2 | Status: CANCELLED | OUTPATIENT
Start: 2019-05-06

## 2019-05-06 ASSESSMENT — PATIENT HEALTH QUESTIONNAIRE - PHQ9
SUM OF ALL RESPONSES TO PHQ9 QUESTIONS 1 & 2: 0
SUM OF ALL RESPONSES TO PHQ QUESTIONS 1-9: 0
1. LITTLE INTEREST OR PLEASURE IN DOING THINGS: 0
2. FEELING DOWN, DEPRESSED OR HOPELESS: 0
SUM OF ALL RESPONSES TO PHQ QUESTIONS 1-9: 0

## 2019-05-06 NOTE — PROGRESS NOTES
Subjective:      Patient ID: Amanda Brumfield 47 y.o. female. The primary encounter diagnosis was Hashimoto's thyroiditis. A diagnosis of Tachycardia was also pertinent to this visit. HPI Jimmy Gowers was diagnosed with Hashimoto's thyroiditis last May. She is followed by Dr. Bel Handy. She was euthyroid in March. She has been experiencing tachycardia again the past 3 to 4 days. She is watching her pulse on her watch. The HR goes up with the slightest activity - walking into [employer] yesterday her pulse increased to 109; was 126 after climbing a flight of steps. HR varies from 50 to 126. The 50s last just a few seconds. She occasionally feels a bit light headed but no syncope. No chest pain. She notes she is a little more dyspneic since taking the Toprol. She just started it yesterday. Since starting the Toprol she feels she has to stop to take a deep breath when talking. Prior to this she felt her asthma is well controlled; has not needed Albuterol inhaler. Had a normal echocardiogram 1/2018. Holter monitor normal 1/2018    Stopped OCP in November. No menses in February and April. No hot flashes or night sweats. Menses are not heavy.       TSH   Date Value Ref Range Status   04/15/2019 1.97 0.27 - 4.20 uIU/mL Final   11/29/2018 2.29 0.27 - 4.20 uIU/mL Final   07/10/2018 2.67 0.27 - 4.20 uIU/mL Final   05/08/2018 0.01 (L) 0.27 - 4.20 uIU/mL Final          Outpatient Medications Marked as Taking for the 5/6/19 encounter (Office Visit) with Angela See MD   Medication Sig Dispense Refill    metoprolol succinate (TOPROL XL) 25 MG extended release tablet Take 1 tablet by mouth daily 30 tablet 2    montelukast (SINGULAIR) 10 MG tablet TAKE 1 TABLET EVERY NIGHT ( DUE FOR APPOINTMENT) 90 tablet 1    fluticasone-salmeterol (ADVAIR DISKUS) 250-50 MCG/DOSE AEPB Inhale 1 puff into the lungs 2 times daily 1 Inhaler 10    albuterol sulfate HFA (PROVENTIL HFA) 108 (90 Base) MCG/ACT inhaler Inhale 2 puffs into the lungs every 6 hours as needed for Wheezing 1 Inhaler 3    Omega-3 Fatty Acids (FISH OIL PO) Take by mouth      fluticasone (FLONASE) 50 MCG/ACT nasal spray USE 2 SPRAYS NASALLY EVERY DAY 3 Bottle 3    fexofenadine (ALLEGRA ALLERGY) 60 MG tablet Take 1 tablet by mouth daily 1 tablet 2    omeprazole (PRILOSEC) 40 MG delayed release capsule Take 1 capsule by mouth daily 30 capsule 3    vitamin D (CHOLECALCIFEROL) 1000 UNIT TABS tablet Take 2,000 Units by mouth daily       Probiotic Product ([address]) CAPS Take 1 capsule by mouth daily 30 capsule 12        Allergies   Allergen Reactions    Hydrocodone Other (See Comments)     Ringing in ears, migraine headache    Augmentin [Amoxicillin-Pot Clavulanate] Diarrhea    Bactrim Rash       Patient Active Problem List   Diagnosis    Asthma    Allergic rhinitis    Migraine    Vitamin D deficiency    Tibialis posterior tendinitis    Hyperthyroidism    Hashimoto's thyroiditis    Closed nondisplaced fracture of fifth right metatarsal bone       Past Medical History:   Diagnosis Date    Allergic rhinitis     Asthma     Chronic cough 1/20/2017    Closed displaced fracture of fifth metatarsal bone of right foot with routine healing 10/30/2018    Ileitis 8/14/2015    Migraine     Vitamin D deficiency        Past Surgical History:   Procedure Laterality Date    BREAST BIOPSY  2012    right    CARPAL TUNNEL RELEASE  3/07    right    EYE SURGERY      X 3        Family History   Problem Relation Age of Onset    Arthritis Mother     High Cholesterol Mother     Diabetes Father     Coronary Art Dis Father 54    Anxiety Disorder Father     Alzheimer's Disease Brother        Social History     Tobacco Use    Smoking status: Never Smoker    Smokeless tobacco: Never Used   Substance Use Topics    Alcohol use: No    Drug use: No            Review of Systems  Review of Systems    Objective:   Physical Exam  Vitals:    05/06/19 1109   BP:

## 2019-05-07 DIAGNOSIS — R00.0 EXERCISE-INDUCED TACHYCARDIA: Primary | ICD-10-CM

## 2019-05-07 LAB
BASOPHILS ABSOLUTE: 0 K/UL (ref 0–0.2)
BASOPHILS RELATIVE PERCENT: 0.6 %
EOSINOPHILS ABSOLUTE: 0.1 K/UL (ref 0–0.6)
EOSINOPHILS RELATIVE PERCENT: 1.2 %
HCT VFR BLD CALC: 40.7 % (ref 36–48)
HEMOGLOBIN: 13.2 G/DL (ref 12–16)
LYMPHOCYTES ABSOLUTE: 2.7 K/UL (ref 1–5.1)
LYMPHOCYTES RELATIVE PERCENT: 37.4 %
MCH RBC QN AUTO: 26.1 PG (ref 26–34)
MCHC RBC AUTO-ENTMCNC: 32.5 G/DL (ref 31–36)
MCV RBC AUTO: 80.4 FL (ref 80–100)
MONOCYTES ABSOLUTE: 0.5 K/UL (ref 0–1.3)
MONOCYTES RELATIVE PERCENT: 6.6 %
NEUTROPHILS ABSOLUTE: 3.9 K/UL (ref 1.7–7.7)
NEUTROPHILS RELATIVE PERCENT: 54.2 %
PDW BLD-RTO: 15 % (ref 12.4–15.4)
PLATELET # BLD: 366 K/UL (ref 135–450)
PMV BLD AUTO: 8.5 FL (ref 5–10.5)
RBC # BLD: 5.07 M/UL (ref 4–5.2)
WBC # BLD: 7.2 K/UL (ref 4–11)

## 2019-05-09 ENCOUNTER — HOSPITAL ENCOUNTER (OUTPATIENT)
Dept: NON INVASIVE DIAGNOSTICS | Age: 54
Discharge: HOME | End: 2019-05-09
Payer: COMMERCIAL

## 2019-05-09 DIAGNOSIS — R00.0 EXERCISE-INDUCED TACHYCARDIA: ICD-10-CM

## 2019-05-09 PROCEDURE — 93017 CV STRESS TEST TRACING ONLY: CPT

## 2019-05-10 ENCOUNTER — OFFICE VISIT (OUTPATIENT)
Dept: FAMILY MEDICINE CLINIC | Age: 54
End: 2019-05-10
Payer: COMMERCIAL

## 2019-05-10 VITALS
OXYGEN SATURATION: 96 % | TEMPERATURE: 95.4 F | HEIGHT: 64 IN | BODY MASS INDEX: 34.15 KG/M2 | RESPIRATION RATE: 8 BRPM | WEIGHT: 200 LBS | DIASTOLIC BLOOD PRESSURE: 73 MMHG | HEART RATE: 83 BPM | SYSTOLIC BLOOD PRESSURE: 130 MMHG

## 2019-05-10 DIAGNOSIS — J45.40 MODERATE PERSISTENT ASTHMA WITHOUT COMPLICATION: Primary | ICD-10-CM

## 2019-05-10 DIAGNOSIS — R00.0 RACING HEART BEAT: ICD-10-CM

## 2019-05-10 PROCEDURE — 99214 OFFICE O/P EST MOD 30 MIN: CPT | Performed by: FAMILY MEDICINE

## 2019-05-10 NOTE — PROGRESS NOTES
Subjective:      Patient ID: Chari Rayo 47 y.o. female. is here for evaluation for dyspnea      HPI    Henny Acevedo continues to complain of her heart rate elevating when exerts - eg walks fast or up a flight of steps. .  She feels winded easily. Coughed for a few hours after her stress test but otherwise has not been cough. She does not feel her chest is tight; does not feel wheezy. Her allergies are well controlled. Has minimal clear rhinorrhea. No sneezing, nasal congestion, ST, PND. Is not light headed and has no pedal edema, PND or orthopnea. Does have mild stress but does not feel anxious. No caffeine, diet pills, herbal products.      Outpatient Medications Marked as Taking for the 5/10/19 encounter (Office Visit) with Timmy Li MD   Medication Sig Dispense Refill           montelukast (SINGULAIR) 10 MG tablet TAKE 1 TABLET EVERY NIGHT ( DUE FOR APPOINTMENT) 90 tablet 1    fluticasone-salmeterol (ADVAIR DISKUS) 250-50 MCG/DOSE AEPB Inhale 1 puff into the lungs 2 times daily 1 Inhaler 10    albuterol sulfate HFA (PROVENTIL HFA) 108 (90 Base) MCG/ACT inhaler Inhale 2 puffs into the lungs every 6 hours as needed for Wheezing 1 Inhaler 3    Omega-3 Fatty Acids (FISH OIL PO) Take by mouth      fluticasone (FLONASE) 50 MCG/ACT nasal spray USE 2 SPRAYS NASALLY EVERY DAY 3 Bottle 3    fexofenadine (ALLEGRA ALLERGY) 60 MG tablet Take 1 tablet by mouth daily 1 tablet 2    omeprazole (PRILOSEC) 40 MG delayed release capsule Take 1 capsule by mouth daily 30 capsule 3    vitamin D (CHOLECALCIFEROL) 1000 UNIT TABS tablet Take 2,000 Units by mouth daily       Probiotic Product ([address]) CAPS Take 1 capsule by mouth daily 30 capsule 12        Allergies   Allergen Reactions    Hydrocodone Other (See Comments)     Ringing in ears, migraine headache    Augmentin [Amoxicillin-Pot Clavulanate] Diarrhea    Bactrim Rash       Patient Active Problem List   Diagnosis    Asthma    Allergic rhinitis    Migraine    Vitamin D deficiency    Hyperthyroidism    Hashimoto's thyroiditis       Past Medical History:   Diagnosis Date    Allergic rhinitis     Asthma     Chronic cough 1/20/2017    Closed displaced fracture of fifth metatarsal bone of right foot with routine healing 10/30/2018    Closed nondisplaced fracture of fifth right metatarsal bone 10/30/2018    Ileitis 8/14/2015    Migraine     Tibialis posterior tendinitis 11/24/2014    Vitamin D deficiency        Past Surgical History:   Procedure Laterality Date    BREAST BIOPSY  2012    right    CARPAL TUNNEL RELEASE  3/07    right    EYE SURGERY      X 3        Family History   Problem Relation Age of Onset    Arthritis Mother     High Cholesterol Mother     Diabetes Father     Coronary Art Dis Father 54    Anxiety Disorder Father     Alzheimer's Disease Brother        Social History     Tobacco Use    Smoking status: Never Smoker    Smokeless tobacco: Never Used   Substance Use Topics    Alcohol use: No    Drug use: No            Review of Systems  Review of Systems  Stress test, echocardiogram, Holter Monitor, CTA reviewed. Objective:   Physical Exam  Vitals:    05/10/19 1652   BP: 130/73   Pulse: 83   Resp: 8   Temp: 95.4 °F (35.2 °C)   TempSrc: Oral   SpO2: 96%   Weight: 200 lb (90.7 kg)   Height: 5' 4\" (1.626 m)       Physical Exam  NAD   mood and affect are mildly anxious. No agitation or psychomotor retardation. Insight and judgement are intact. Not suicidal.   Ear exam - bilateral normal, TM intact without perforation or effusion, external canal normal. No significant ceruminosis noted. Nasal exam; septum midline, no deformities, nares patent, normal mucosa without swelling, no polyps, no bleeding. Pharynx normal, no oral lesions, dentition in good repair. No cervical, supraclavicular or submandibular LNS. Thyroid not enlarged, no nodules detected. Chest is clear, no wheezing or rales.  Normal symmetric air entry throughout both lung fields. Heart regular with normal rate, no murmer or gallop PMI is not displaced. No heave or thrill  The abdomen is soft without tenderness, guarding, mass, rebound or organomegaly. Bowel sounds are normal.  Aorta, femoral, DP and PT pulses intact. No pedal edema. Assessment:       Diagnosis Orders   1. Moderate persistent asthma without complication  fluticasone-salmeterol (ADVAIR DISKUS) 500-50 MCG/DOSE diskus inhaler  Increase dose from 250 to 500   If sxs not resolved in 5 to 7 days consider    2. Racing heart beat  CV tests all normal. Consider atypical asthma? Consider repeat Holter as she was not active the day she had it on (was in a boot from foot injury) and/or PFTs. Plan:      Side effects of current medications reviewed and questions answered. Follow up in 4 weeks or prn.

## 2019-05-30 NOTE — PROGRESS NOTES
Subjective:      Patient ID: Janiya Marcum 47 y.o. female. The primary encounter diagnosis was Hashimoto's thyroiditis. A diagnosis of Moderate persistent asthma without complication was also pertinent to this visit. HPI  Mazin Helton was in last month with dyspnea, feeling winded easily. Felt HR elevated with exertion. Labs showed she was not hyperthyroid or anemic. Dose of Advair was increased from 250 to 500. She feels much better. She has dyspnea only with walking steps. Has not had to use inhaler except the past 2 days with URI. The feeling that her heart is racing has resolved. No HS wheezing. She complains of a URI x 10 days. Nasal congestion, no discharge. Cough was initially productive clear to white. Now yellow and thick. No chest pain. Mild dyspnea - had to use rescue inhaler twice in the past few days.        Outpatient Medications Marked as Taking for the 5/31/19 encounter (Office Visit) with Kodi Luna MD   Medication Sig Dispense Refill    fluticasone-salmeterol (ADVAIR DISKUS) 500-50 MCG/DOSE diskus inhaler Inhale 1 puff into the lungs every 12 hours 60 each 3    montelukast (SINGULAIR) 10 MG tablet TAKE 1 TABLET EVERY NIGHT ( DUE FOR APPOINTMENT) 90 tablet 1    albuterol sulfate HFA (PROVENTIL HFA) 108 (90 Base) MCG/ACT inhaler Inhale 2 puffs into the lungs every 6 hours as needed for Wheezing 1 Inhaler 3    Omega-3 Fatty Acids (FISH OIL PO) Take by mouth      fluticasone (FLONASE) 50 MCG/ACT nasal spray USE 2 SPRAYS NASALLY EVERY DAY 3 Bottle 3    fexofenadine (ALLEGRA ALLERGY) 60 MG tablet Take 1 tablet by mouth daily 1 tablet 2    omeprazole (PRILOSEC) 40 MG delayed release capsule Take 1 capsule by mouth daily 30 capsule 3    vitamin D (CHOLECALCIFEROL) 1000 UNIT TABS tablet Take 2,000 Units by mouth daily       Probiotic Product ([address]) CAPS Take 1 capsule by mouth daily 30 capsule 12        Allergies   Allergen Reactions    Hydrocodone Other (See Comments)     Ringing in ears, migraine headache    Augmentin [Amoxicillin-Pot Clavulanate] Diarrhea    Bactrim Rash       Patient Active Problem List   Diagnosis    Asthma    Allergic rhinitis    Migraine    Vitamin D deficiency    Hyperthyroidism    Hashimoto's thyroiditis       Past Medical History:   Diagnosis Date    Allergic rhinitis     Asthma     Chronic cough 1/20/2017    Closed displaced fracture of fifth metatarsal bone of right foot with routine healing 10/30/2018    Closed nondisplaced fracture of fifth right metatarsal bone 10/30/2018    Ileitis 8/14/2015    Migraine     Tibialis posterior tendinitis 11/24/2014    Vitamin D deficiency        Past Surgical History:   Procedure Laterality Date    BREAST BIOPSY  2012    right    CARPAL TUNNEL RELEASE  3/07    right    EYE SURGERY      X 3        Family History   Problem Relation Age of Onset    Arthritis Mother     High Cholesterol Mother     Diabetes Father     Coronary Art Dis Father 54    Anxiety Disorder Father     Alzheimer's Disease Brother        Social History     Tobacco Use    Smoking status: Never Smoker    Smokeless tobacco: Never Used   Substance Use Topics    Alcohol use: No    Drug use: No            Review of Systems  Review of Systems  Lab Results   Component Value Date     05/06/2019    K 4.6 05/06/2019     05/06/2019    CO2 26 05/06/2019    BUN 9 05/06/2019    CREATININE 0.8 05/06/2019    GLUCOSE 111 (H) 05/06/2019    CALCIUM 10.2 05/06/2019    PROT 6.8 05/08/2018    LABALBU 4.1 05/08/2018    BILITOT <0.2 05/08/2018    ALKPHOS 90 05/08/2018    AST 11 (L) 05/08/2018    ALT 16 05/08/2018    LABGLOM >60 05/06/2019    GFRAA >60 05/06/2019    AGRATIO 1.5 05/08/2018    GLOB 2.7 05/08/2018        TSH   Date Value Ref Range Status   05/06/2019 2.80 0.27 - 4.20 uIU/mL Final   04/15/2019 1.97 0.27 - 4.20 uIU/mL Final   11/29/2018 2.29 0.27 - 4.20 uIU/mL Final   05/08/2018 0.01 (L) 0.27 - 4.20 uIU/mL Final Lab Results   Component Value Date    WBC 7.2 05/06/2019    HGB 13.2 05/06/2019    HCT 40.7 05/06/2019    MCV 80.4 05/06/2019     05/06/2019       Objective:   Physical Exam  Vitals:    05/31/19 1408   BP: 124/69   Pulse: 81   Temp: 98.3 °F (36.8 °C)   TempSrc: Oral   SpO2: 97%   Weight: 201 lb 6.4 oz (91.4 kg)       Physical Exam  NAD  Skin turgor normal, no rash. No respiratory distress. Ear exam - bilateral normal, TM intact without perforation or effusion, external canal normal. No significant ceruminosis noted. Nasal exam; septum midline, no deformities, nares patent, normal mucosa with mild swelling, no polyps, no bleeding. Pharynx normal, no oral lesions, dentition in good repair. No cervical, supraclavicular or submandibular LNS. Lungs bilateral rhonchi, no wheeze. Card reg. Ext - no c/c/e      Assessment:       Diagnosis Orders   1. Hashimoto's thyroiditis  Stable. Continue to monitor. 2. Moderate persistent asthma without complication  fluticasone-salmeterol (ADVAIR DISKUS) 500-50 MCG/DOSE diskus inhaler    albuterol sulfate HFA (PROVENTIL HFA) 108 (90 Base) MCG/ACT inhaler  Well controlled with higher dose of Advair        3 Acute bronchitis, unspecified organism  doxycycline hyclate (VIBRAMYCIN) 100 MG capsule          Plan:      Side effects of current medications reviewed and questions answered. She is moving [place] and will follow up with new PCP there.

## 2019-05-31 ENCOUNTER — OFFICE VISIT (OUTPATIENT)
Dept: FAMILY MEDICINE CLINIC | Age: 54
End: 2019-05-31
Payer: COMMERCIAL

## 2019-05-31 VITALS
OXYGEN SATURATION: 97 % | HEART RATE: 81 BPM | TEMPERATURE: 98.3 F | SYSTOLIC BLOOD PRESSURE: 124 MMHG | DIASTOLIC BLOOD PRESSURE: 69 MMHG | BODY MASS INDEX: 34.57 KG/M2 | WEIGHT: 201.4 LBS

## 2019-05-31 DIAGNOSIS — J20.9 ACUTE BRONCHITIS, UNSPECIFIED ORGANISM: ICD-10-CM

## 2019-05-31 DIAGNOSIS — J45.40 MODERATE PERSISTENT ASTHMA WITHOUT COMPLICATION: ICD-10-CM

## 2019-05-31 DIAGNOSIS — E06.3 HASHIMOTO'S THYROIDITIS: Primary | ICD-10-CM

## 2019-05-31 PROCEDURE — 99214 OFFICE O/P EST MOD 30 MIN: CPT | Performed by: FAMILY MEDICINE

## 2019-05-31 RX ORDER — DOXYCYCLINE HYCLATE 100 MG/1
100 CAPSULE ORAL 2 TIMES DAILY
Qty: 20 CAPSULE | Refills: 0 | Status: SHIPPED | OUTPATIENT
Start: 2019-05-31 | End: 2019-06-10

## 2019-05-31 RX ORDER — ALBUTEROL SULFATE 90 UG/1
2 AEROSOL, METERED RESPIRATORY (INHALATION) EVERY 6 HOURS PRN
Qty: 3 INHALER | Refills: 1 | Status: SHIPPED | OUTPATIENT
Start: 2019-05-31 | End: 2022-03-25 | Stop reason: SDUPTHER

## 2019-07-18 DIAGNOSIS — J45.30 MILD PERSISTENT ASTHMA WITHOUT COMPLICATION: ICD-10-CM

## 2019-07-18 RX ORDER — MONTELUKAST SODIUM 10 MG/1
TABLET ORAL
Qty: 90 TABLET | Refills: 2 | Status: SHIPPED | OUTPATIENT
Start: 2019-07-18 | End: 2020-04-13

## 2019-07-23 ENCOUNTER — PATIENT MESSAGE (OUTPATIENT)
Dept: FAMILY MEDICINE CLINIC | Age: 54
End: 2019-07-23

## 2019-07-24 RX ORDER — AZITHROMYCIN 250 MG/1
250 TABLET, FILM COATED ORAL SEE ADMIN INSTRUCTIONS
Qty: 6 TABLET | Refills: 0 | Status: SHIPPED | OUTPATIENT
Start: 2019-07-24 | End: 2019-07-29

## 2019-07-24 NOTE — TELEPHONE ENCOUNTER
From: Rema Pastrana  To: Gurvinder Swan MD  Sent: 7/23/2019 8:03 PM EDT  Subject: Non-Urgent Medical Question    I would use. [facility] at  [id number] [address], [facility] [address] number is [phone]   Is that what you need? Kris Darian     ----- Message -----  From: Gurvinder Swan MD  Sent: 7/23/19, 7:06 PM  To: Rema Pastrana  Subject: RE: Non-Urgent Medical Question    [facility] 2 to get you a medication. Let me know what local pharmacy you use.    ----- Message -----   From: Rema Pastrana   Sent: 7/23/2019 1:37 PM EDT   To: Gurvinder Swan MD  Subject: Non-Urgent Medical Question    Hi ,You said You could help me until I found a doc here. .. I have a big squeak in my lungs as I exhale and am coughing up yellow and green. And headaches. I have been using mucinex but this started over a week ago. Hoping [facility].

## 2020-05-06 ENCOUNTER — E-VISIT (OUTPATIENT)
Dept: FAMILY MEDICINE CLINIC | Age: 55
End: 2020-05-06
Payer: COMMERCIAL

## 2020-05-06 PROCEDURE — 99421 OL DIG E/M SVC 5-10 MIN: CPT | Performed by: FAMILY MEDICINE

## 2020-05-06 ASSESSMENT — LIFESTYLE VARIABLES: SMOKING_STATUS: NO, I'VE NEVER SMOKED

## 2020-07-14 RX ORDER — MONTELUKAST SODIUM 10 MG/1
TABLET ORAL
Qty: 90 TABLET | Refills: 3 | Status: SHIPPED | OUTPATIENT
Start: 2020-07-14 | End: 2021-07-08

## 2020-12-30 ENCOUNTER — OFFICE VISIT (OUTPATIENT)
Dept: FAMILY MEDICINE CLINIC | Age: 55
End: 2020-12-30
Payer: COMMERCIAL

## 2020-12-30 VITALS
OXYGEN SATURATION: 96 % | TEMPERATURE: 97.3 F | HEIGHT: 64 IN | SYSTOLIC BLOOD PRESSURE: 123 MMHG | HEART RATE: 91 BPM | WEIGHT: 174 LBS | BODY MASS INDEX: 29.71 KG/M2 | RESPIRATION RATE: 12 BRPM | DIASTOLIC BLOOD PRESSURE: 58 MMHG

## 2020-12-30 PROCEDURE — 90686 IIV4 VACC NO PRSV 0.5 ML IM: CPT | Performed by: FAMILY MEDICINE

## 2020-12-30 PROCEDURE — 90471 IMMUNIZATION ADMIN: CPT | Performed by: FAMILY MEDICINE

## 2020-12-30 PROCEDURE — 99213 OFFICE O/P EST LOW 20 MIN: CPT | Performed by: FAMILY MEDICINE

## 2020-12-30 RX ORDER — DOXYCYCLINE HYCLATE 100 MG
100 TABLET ORAL 2 TIMES DAILY
Qty: 20 TABLET | Refills: 0 | Status: SHIPPED | OUTPATIENT
Start: 2020-12-30 | End: 2021-03-24 | Stop reason: SDUPTHER

## 2020-12-30 RX ORDER — PREDNISONE 20 MG/1
20 TABLET ORAL 2 TIMES DAILY
Qty: 10 TABLET | Refills: 0 | Status: SHIPPED | OUTPATIENT
Start: 2020-12-30 | End: 2021-01-04

## 2020-12-30 SDOH — ECONOMIC STABILITY: FOOD INSECURITY: WITHIN THE PAST 12 MONTHS, THE FOOD YOU BOUGHT JUST DIDN'T LAST AND YOU DIDN'T HAVE MONEY TO GET MORE.: NEVER TRUE

## 2020-12-30 SDOH — ECONOMIC STABILITY: TRANSPORTATION INSECURITY
IN THE PAST 12 MONTHS, HAS THE LACK OF TRANSPORTATION KEPT YOU FROM MEDICAL APPOINTMENTS OR FROM GETTING MEDICATIONS?: NOT ASKED

## 2020-12-30 SDOH — ECONOMIC STABILITY: INCOME INSECURITY: HOW HARD IS IT FOR YOU TO PAY FOR THE VERY BASICS LIKE FOOD, HOUSING, MEDICAL CARE, AND HEATING?: NOT HARD AT ALL

## 2020-12-30 SDOH — ECONOMIC STABILITY: TRANSPORTATION INSECURITY
IN THE PAST 12 MONTHS, HAS LACK OF TRANSPORTATION KEPT YOU FROM MEETINGS, WORK, OR FROM GETTING THINGS NEEDED FOR DAILY LIVING?: NOT ASKED

## 2020-12-30 SDOH — ECONOMIC STABILITY: FOOD INSECURITY: WITHIN THE PAST 12 MONTHS, YOU WORRIED THAT YOUR FOOD WOULD RUN OUT BEFORE YOU GOT MONEY TO BUY MORE.: NEVER TRUE

## 2020-12-30 ASSESSMENT — PATIENT HEALTH QUESTIONNAIRE - PHQ9
2. FEELING DOWN, DEPRESSED OR HOPELESS: 0
1. LITTLE INTEREST OR PLEASURE IN DOING THINGS: 0
SUM OF ALL RESPONSES TO PHQ QUESTIONS 1-9: 0
SUM OF ALL RESPONSES TO PHQ9 QUESTIONS 1 & 2: 0

## 2020-12-30 NOTE — PROGRESS NOTES
TRIG 54 09/20/2011     Lab Results   Component Value Date    HDL 65 (H) 05/08/2018    HDL 57 09/19/2013    HDL 58 09/20/2011     Lab Results   Component Value Date    LDLCALC 80 05/08/2018    LDLCALC 83 09/19/2013    LDLCALC 80 09/20/2011     Lab Results   Component Value Date    LABVLDL 22 05/08/2018    LABVLDL 12 09/19/2013    LABVLDL 11 09/20/2011     No results found for: CHOLHDLRATIO      Outpatient Medications Marked as Taking for the 12/30/20 encounter (Office Visit) with Lisbeth Montelongo MD   Medication Sig Dispense Refill    montelukast (SINGULAIR) 10 MG tablet TAKE 1 TABLET EVERY NIGHT (DUE FOR APPOINTMENT) 90 tablet 3    fluticasone-salmeterol (ADVAIR) 250-50 MCG/DOSE AEPB USE 1 INHALATION EVERY 12 HOURS 180 each 2    albuterol sulfate HFA (PROVENTIL HFA) 108 (90 Base) MCG/ACT inhaler Inhale 2 puffs into the lungs every 6 hours as needed for Wheezing 3 Inhaler 1    Omega-3 Fatty Acids (FISH OIL PO) Take by mouth      fluticasone (FLONASE) 50 MCG/ACT nasal spray USE 2 SPRAYS NASALLY EVERY DAY 3 Bottle 3    fexofenadine (ALLEGRA ALLERGY) 60 MG tablet Take 1 tablet by mouth daily 1 tablet 2    vitamin D (CHOLECALCIFEROL) 1000 UNIT TABS tablet Take 2,000 Units by mouth daily       Probiotic Product ([address]) CAPS Take 1 capsule by mouth daily 30 capsule 12        Allergies   Allergen Reactions    Hydrocodone Other (See Comments)     Ringing in ears, migraine headache    Augmentin [Amoxicillin-Pot Clavulanate] Diarrhea    Bactrim Rash       Patient Active Problem List   Diagnosis    Asthma    Allergic rhinitis    Migraine    Vitamin D deficiency    Hyperthyroidism    Hashimoto's thyroiditis       Past Medical History:   Diagnosis Date    Allergic rhinitis     Asthma     Chronic cough 1/20/2017    Closed displaced fracture of fifth metatarsal bone of right foot with routine healing 10/30/2018    Closed nondisplaced fracture of fifth right metatarsal bone 10/30/2018  Ileitis 8/14/2015    Migraine     Tibialis posterior tendinitis 11/24/2014    Vitamin D deficiency        Past Surgical History:   Procedure Laterality Date    BREAST BIOPSY  2012    right    CARPAL TUNNEL RELEASE  3/07    right    EYE SURGERY      X 3        Family History   Problem Relation Age of Onset    Arthritis Mother     High Cholesterol Mother     Diabetes Father     Coronary Art Dis Father 54    Anxiety Disorder Father     Alzheimer's Disease Brother        Social History     Tobacco Use    Smoking status: Never Smoker    Smokeless tobacco: Never Used   Substance Use Topics    Alcohol use: No    Drug use: No            Review of Systems  Review of Systems    Objective:   Physical Exam  Vitals:    12/30/20 1533   BP: (!) 123/58   Pulse: 91   Resp: 12   Temp: 97.3 °F (36.3 °C)   TempSrc: Temporal   SpO2: 96%   Weight: 174 lb (78.9 kg)   Height: 5' 4\" (1.626 m)     Wt Readings from Last 3 Encounters:   12/30/20 174 lb (78.9 kg)   05/31/19 201 lb 6.4 oz (91.4 kg)   05/10/19 200 lb (90.7 kg)        Physical Exam    NAD  No respiratory distress. Skin is warm and dry. Well hydrated, no rash. TM/EAC clear. No cervical, supraclavicular or submandibular LNS. Lungs: bilateral rhonchi and end exp wheeze . No rales, retraction or accessory muscle use. Heart regular with normal rate, no murmer or gallop   . Ext no c/c/e  Assessment:       Diagnosis Orders   1. Moderate persistent asthma with exacerbation  doxycycline hyclate (VIBRA-TABS) 100 MG tablet    predniSONE (DELTASONE) 20 MG tablet   2. Encounter for screening mammogram for malignant neoplasm of breast  ELENA DIGITAL SCREEN W OR WO CAD BILATERAL   3. Need for influenza vaccination  INFLUENZA, QUADV, 0.5ML, 6 MO AND OLDER, IM, PF, PREFILL SYR OR SDV (FLUZONE QUADV, PF)          Plan:      Side effects of current medications reviewed and questions answered. Call or return to clinic prn if these symptoms worsen or fail to improve as anticipated.

## 2021-03-24 ENCOUNTER — OFFICE VISIT (OUTPATIENT)
Dept: FAMILY MEDICINE CLINIC | Age: 56
End: 2021-03-24
Payer: COMMERCIAL

## 2021-03-24 VITALS
BODY MASS INDEX: 30.39 KG/M2 | TEMPERATURE: 97.3 F | HEIGHT: 64 IN | RESPIRATION RATE: 16 BRPM | WEIGHT: 178 LBS | DIASTOLIC BLOOD PRESSURE: 76 MMHG | HEART RATE: 90 BPM | OXYGEN SATURATION: 96 % | SYSTOLIC BLOOD PRESSURE: 124 MMHG

## 2021-03-24 DIAGNOSIS — R42 VERTIGO: Primary | ICD-10-CM

## 2021-03-24 DIAGNOSIS — J20.9 ACUTE BRONCHITIS, UNSPECIFIED ORGANISM: ICD-10-CM

## 2021-03-24 DIAGNOSIS — K58.0 IRRITABLE BOWEL SYNDROME WITH DIARRHEA: ICD-10-CM

## 2021-03-24 PROCEDURE — 99214 OFFICE O/P EST MOD 30 MIN: CPT | Performed by: FAMILY MEDICINE

## 2021-03-24 RX ORDER — MECLIZINE HCL 12.5 MG/1
12.5-25 TABLET ORAL 3 TIMES DAILY PRN
Qty: 20 TABLET | Refills: 0 | Status: SHIPPED | OUTPATIENT
Start: 2021-03-24 | End: 2021-04-03

## 2021-03-24 RX ORDER — DOXYCYCLINE HYCLATE 100 MG
100 TABLET ORAL 2 TIMES DAILY
Qty: 20 TABLET | Refills: 0 | Status: SHIPPED | OUTPATIENT
Start: 2021-03-24 | End: 2022-03-25 | Stop reason: SDUPTHER

## 2021-03-24 ASSESSMENT — PATIENT HEALTH QUESTIONNAIRE - PHQ9
SUM OF ALL RESPONSES TO PHQ QUESTIONS 1-9: 0
1. LITTLE INTEREST OR PLEASURE IN DOING THINGS: 0
SUM OF ALL RESPONSES TO PHQ QUESTIONS 1-9: 0
2. FEELING DOWN, DEPRESSED OR HOPELESS: 0
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
## 2022-03-25 ENCOUNTER — TELEMEDICINE (OUTPATIENT)
Dept: FAMILY MEDICINE CLINIC | Age: 57
End: 2022-03-25
Payer: COMMERCIAL

## 2022-03-25 DIAGNOSIS — M26.629 TMJ SYNDROME: ICD-10-CM

## 2022-03-25 DIAGNOSIS — J01.00 ACUTE MAXILLARY SINUSITIS, RECURRENCE NOT SPECIFIED: Primary | ICD-10-CM

## 2022-03-25 DIAGNOSIS — J45.40 MODERATE PERSISTENT ASTHMA WITHOUT COMPLICATION: ICD-10-CM

## 2022-03-25 DIAGNOSIS — J30.9 ALLERGIC RHINITIS, UNSPECIFIED SEASONALITY, UNSPECIFIED TRIGGER: ICD-10-CM

## 2022-03-25 PROCEDURE — 99214 OFFICE O/P EST MOD 30 MIN: CPT | Performed by: FAMILY MEDICINE

## 2022-03-25 RX ORDER — ALBUTEROL SULFATE 90 UG/1
2 AEROSOL, METERED RESPIRATORY (INHALATION) EVERY 6 HOURS PRN
Qty: 1 EACH | Refills: 2 | Status: SHIPPED | OUTPATIENT
Start: 2022-03-25

## 2022-03-25 RX ORDER — DOXYCYCLINE HYCLATE 100 MG
100 TABLET ORAL 2 TIMES DAILY
Qty: 20 TABLET | Refills: 0 | Status: SHIPPED | OUTPATIENT
Start: 2022-03-25 | End: 2022-04-04

## 2022-03-25 RX ORDER — MONTELUKAST SODIUM 10 MG/1
TABLET ORAL
Qty: 90 TABLET | Refills: 3 | Status: SHIPPED | OUTPATIENT
Start: 2022-03-25
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
## 2022-08-24 ENCOUNTER — OFFICE VISIT (OUTPATIENT)
Dept: FAMILY MEDICINE CLINIC | Age: 57
End: 2022-08-24
Payer: COMMERCIAL

## 2022-08-24 VITALS
BODY MASS INDEX: 32.61 KG/M2 | HEART RATE: 82 BPM | DIASTOLIC BLOOD PRESSURE: 66 MMHG | HEIGHT: 64 IN | OXYGEN SATURATION: 99 % | RESPIRATION RATE: 14 BRPM | SYSTOLIC BLOOD PRESSURE: 118 MMHG | TEMPERATURE: 97.5 F | WEIGHT: 191 LBS

## 2022-08-24 DIAGNOSIS — R73.01 ELEVATED FASTING GLUCOSE: ICD-10-CM

## 2022-08-24 DIAGNOSIS — J01.00 ACUTE NON-RECURRENT MAXILLARY SINUSITIS: Primary | ICD-10-CM

## 2022-08-24 DIAGNOSIS — Z12.31 ENCOUNTER FOR SCREENING MAMMOGRAM FOR MALIGNANT NEOPLASM OF BREAST: ICD-10-CM

## 2022-08-24 DIAGNOSIS — Z12.11 COLON CANCER SCREENING: ICD-10-CM

## 2022-08-24 DIAGNOSIS — Z00.00 WELL ADULT EXAM: ICD-10-CM

## 2022-08-24 PROCEDURE — 99214 OFFICE O/P EST MOD 30 MIN: CPT | Performed by: FAMILY MEDICINE

## 2022-08-24 RX ORDER — PREDNISONE 20 MG/1
20 TABLET ORAL 2 TIMES DAILY
Qty: 8 TABLET | Refills: 0 | Status: SHIPPED | OUTPATIENT
Start: 2022-08-24 | End: 2022-08-28

## 2022-08-24 RX ORDER — DOXYCYCLINE HYCLATE 100 MG
100 TABLET ORAL 2 TIMES DAILY
Qty: 20 TABLET | Refills: 0 | Status: SHIPPED | OUTPATIENT
Start: 2022-08-24 | End: 2022-09-03

## 2022-08-24 SDOH — ECONOMIC STABILITY: FOOD INSECURITY: WITHIN THE PAST 12 MONTHS, THE FOOD YOU BOUGHT JUST DIDN'T LAST AND YOU DIDN'T HAVE MONEY TO GET MORE.: NEVER TRUE

## 2022-08-24 SDOH — ECONOMIC STABILITY: FOOD INSECURITY: WITHIN THE PAST 12 MONTHS, YOU WORRIED THAT YOUR FOOD WOULD RUN OUT BEFORE YOU GOT MONEY TO BUY MORE.: NEVER TRUE

## 2022-08-24 ASSESSMENT — PATIENT HEALTH QUESTIONNAIRE - PHQ9
1. LITTLE INTEREST OR PLEASURE IN DOING THINGS: 0
2. FEELING DOWN, DEPRESSED OR HOPELESS: 0
SUM OF ALL RESPONSES TO PHQ9 QUESTIONS 1 & 2: 0
SUM OF ALL RESPONSES TO PHQ QUESTIONS 1-9: 0

## 2022-08-24 ASSESSMENT — SOCIAL DETERMINANTS OF HEALTH (SDOH): HOW HARD IS IT FOR YOU TO PAY FOR THE VERY BASICS LIKE FOOD, HOUSING, MEDICAL CARE, AND HEATING?: NOT HARD AT ALL

## 2022-09-21 DIAGNOSIS — J45.40 MODERATE PERSISTENT ASTHMA WITHOUT COMPLICATION: ICD-10-CM

## 2022-09-21 RX ORDER — FLUTICASONE PROPIONATE AND SALMETEROL 250; 50 UG/1; UG/1
POWDER RESPIRATORY (INHALATION)
Qty: 180 EACH | Refills: 1 | Status: SHIPPED | OUTPATIENT
Start: 2022-09-21

## 2022-09-21 NOTE — TELEPHONE ENCOUNTER
Requested Prescriptions     Pending Prescriptions Disp Refills    fluticasone-salmeterol (ADVAIR DISKUS) 250-50 MCG/ACT AEPB diskus inhaler [Pharmacy Med Name: Kenyatta Alonzo 60'S 250/50MCG]  3     Sig: USE 1 INHALATION EVERY 12 HOURS     Last ov 8/24/22  Last lab 11/23/20  Next ov n/a

## 2022-09-21 NOTE — TELEPHONE ENCOUNTER
Requested Prescriptions     Pending Prescriptions Disp Refills    fluticasone-salmeterol (ADVAIR DISKUS) 250-50 MCG/ACT AEPB diskus inhaler [Pharmacy Med Name: Lor Lakelsea 60'S 250/50MCG]  3     Sig: USE 1 INHALATION EVERY 12 HOURS     Last OV-8/24/2022  Labs- 11/23/20  NFOV

## 2022-12-12 NOTE — PROGRESS NOTES
Subjective:      Patient ID: Collin  55 y.o. female. is here for evaluation for urgent care follow up      HPI    2 weeks ago developed cough. Took Mucinex. About a week later feel light  headed and woozy. BP on her watch 79 to 108/. Went to urgent care; was prescribed steroid and antibiotic for wheezing and cough. Pushed fluids and has not had any more light headedness. On day 5 steroid and doxycycline. Has pain in the right chest with cough, deep breath. Cough was productive thick green mucous. Is improving. Is > 50 % better. Still feels fatigued and has tightness in her chest with exertion. No palpations, fever, nasal congestion, PND. ST only with cough. No nausea, vomiting or new diarrhea. She still has dyspnea if runs up the steps. Used Albuterol a few times this week, helps. Has some burning in the sternum since on doxy.       Outpatient Medications Marked as Taking for the 12/13/22 encounter (Office Visit) with Demetrius Peralta MD   Medication Sig Dispense Refill    fluticasone-salmeterol (ADVAIR DISKUS) 250-50 MCG/ACT AEPB diskus inhaler USE 1 INHALATION EVERY 12 HOURS 180 each 1    albuterol sulfate HFA (PROVENTIL HFA) 108 (90 Base) MCG/ACT inhaler Inhale 2 puffs into the lungs every 6 hours as needed for Wheezing or Shortness of Breath 1 each 2    montelukast (SINGULAIR) 10 MG tablet TAKE 1 TABLET EVERY NIGHT (DUE FOR APPOINTMENT) 90 tablet 3    Omega-3 Fatty Acids (FISH OIL PO) Take by mouth      fluticasone (FLONASE) 50 MCG/ACT nasal spray USE 2 SPRAYS NASALLY EVERY DAY 3 Bottle 3    fexofenadine (ALLEGRA ALLERGY) 60 MG tablet Take 1 tablet by mouth daily 1 tablet 2    vitamin D (CHOLECALCIFEROL) 1000 UNIT TABS tablet Take 2,000 Units by mouth daily       Probiotic Product ([address]) CAPS Take 1 capsule by mouth daily 30 capsule 12        Allergies   Allergen Reactions    Hydrocodone Other (See Comments)     Ringing in ears, migraine headache    Augmentin lung fields. Heart regular with normal rate, no murmer or gallop   No chest wall tenderness. EKG normal    Assessment:       Diagnosis Orders   1. Acute bronchitis, unspecified organism  dextromethorphan (DELSYM) 30 MG/5ML extended release liquid  Improving. Continue Doxycycline and prednisone. Follow up if does not continue to improve or if sxs worsen off steroids      2. Gastroesophageal reflux disease, unspecified whether esophagitis present  Famotidine-Ca Carb-Mag Hydrox (PEPCID COMPLETE) -165 MG CHEW  Secondary to doxycycline. Pepcid until off antibx. 3. Chest pain, unspecified type  EKG 12 Lead - Clinic Performed  Low suspicion for cardiac cause. Likely combination muscular from cough and GERD from antibx          4. Vaccines:  needs pneumovax. Will have flu and COVID at pharmacy now then come back in 2 to 4 weeks for Prevnar 20   Plan:      Side effects of current medications reviewed and questions answered. Call or return to clinic prn if these symptoms worsen or fail to improve as anticipated.

## 2022-12-13 ENCOUNTER — OFFICE VISIT (OUTPATIENT)
Dept: FAMILY MEDICINE CLINIC | Age: 57
End: 2022-12-13
Payer: COMMERCIAL

## 2022-12-13 VITALS
DIASTOLIC BLOOD PRESSURE: 76 MMHG | HEART RATE: 82 BPM | TEMPERATURE: 98.1 F | SYSTOLIC BLOOD PRESSURE: 118 MMHG | OXYGEN SATURATION: 99 % | RESPIRATION RATE: 18 BRPM | WEIGHT: 197.4 LBS | BODY MASS INDEX: 33.88 KG/M2

## 2022-12-13 DIAGNOSIS — J20.9 ACUTE BRONCHITIS, UNSPECIFIED ORGANISM: Primary | ICD-10-CM

## 2022-12-13 DIAGNOSIS — R07.9 CHEST PAIN, UNSPECIFIED TYPE: ICD-10-CM

## 2022-12-13 DIAGNOSIS — Z23 NEED FOR VACCINATION: ICD-10-CM

## 2022-12-13 DIAGNOSIS — K21.9 GASTROESOPHAGEAL REFLUX DISEASE, UNSPECIFIED WHETHER ESOPHAGITIS PRESENT: ICD-10-CM

## 2022-12-13 PROCEDURE — 93000 ELECTROCARDIOGRAM COMPLETE: CPT | Performed by: FAMILY MEDICINE

## 2022-12-13 PROCEDURE — 99214 OFFICE O/P EST MOD 30 MIN: CPT | Performed by: FAMILY MEDICINE

## 2022-12-13 RX ORDER — FAMOTIDINE, CALCIUM CARBONATE, AND MAGNESIUM HYDROXIDE 10; 800; 165 MG/1; MG/1; MG/1
1 TABLET, CHEWABLE ORAL DAILY
Qty: 60 TABLET | Refills: 3 | COMMUNITY
Start: 2022-12-13

## 2022-12-13 RX ORDER — DEXTROMETHORPHAN POLISTIREX 30 MG/5ML
60 SUSPENSION ORAL 2 TIMES DAILY PRN
COMMUNITY
Start: 2022-12-13 | End: 2022-12-23

## 2022-12-17 ENCOUNTER — PATIENT MESSAGE (OUTPATIENT)
Dept: FAMILY MEDICINE CLINIC | Age: 57
End: 2022-12-17

## 2022-12-17 DIAGNOSIS — R05.1 ACUTE COUGH: Primary | ICD-10-CM

## 2022-12-17 DIAGNOSIS — R06.2 WHEEZING: ICD-10-CM

## 2022-12-18 RX ORDER — FLUTICASONE PROPIONATE AND SALMETEROL 500; 50 UG/1; UG/1
1 POWDER RESPIRATORY (INHALATION) EVERY 12 HOURS
Qty: 60 EACH | Refills: 2 | Status: SHIPPED | OUTPATIENT
Start: 2022-12-18

## 2022-12-18 RX ORDER — PREDNISONE 10 MG/1
TABLET ORAL
Qty: 31 TABLET | Refills: 0 | Status: SHIPPED | OUTPATIENT
Start: 2022-12-18

## 2022-12-18 NOTE — TELEPHONE ENCOUNTER
From: Collin   To: Dr. Thania Vides: 12/17/2022 4:03 PM EST  Subject: Feedback after visit    Dr Viry Lopez - you asked me to reach out if I was not feeling better - feels like things have regressed starting yesterday. Breathing is more labored, similar to prior to steroid. I am taking the cough medicine and the pepsid you suggested. Is there something else you would suggest? I really feel in need some relief.      Mayra Marion

## 2022-12-19 ENCOUNTER — HOSPITAL ENCOUNTER (OUTPATIENT)
Dept: GENERAL RADIOLOGY | Age: 57
Discharge: HOME | End: 2022-12-19
Payer: COMMERCIAL

## 2022-12-19 DIAGNOSIS — R06.2 WHEEZING: ICD-10-CM

## 2022-12-19 DIAGNOSIS — R05.1 ACUTE COUGH: ICD-10-CM

## 2022-12-19 PROCEDURE — 71046 X-RAY EXAM CHEST 2 VIEWS: CPT

## 2023-02-15 ENCOUNTER — OFFICE VISIT (OUTPATIENT)
Dept: FAMILY MEDICINE CLINIC | Age: 58
End: 2023-02-15
Payer: COMMERCIAL

## 2023-02-15 VITALS
DIASTOLIC BLOOD PRESSURE: 70 MMHG | SYSTOLIC BLOOD PRESSURE: 108 MMHG | OXYGEN SATURATION: 96 % | RESPIRATION RATE: 14 BRPM | WEIGHT: 197.4 LBS | TEMPERATURE: 98.3 F | BODY MASS INDEX: 33.88 KG/M2 | HEART RATE: 92 BPM

## 2023-02-15 DIAGNOSIS — L30.9 ECZEMA, UNSPECIFIED TYPE: ICD-10-CM

## 2023-02-15 DIAGNOSIS — M77.8 TENDINITIS OF RIGHT SHOULDER: Primary | ICD-10-CM

## 2023-02-15 PROCEDURE — 99213 OFFICE O/P EST LOW 20 MIN: CPT | Performed by: FAMILY MEDICINE

## 2023-02-15 SDOH — ECONOMIC STABILITY: HOUSING INSECURITY
IN THE LAST 12 MONTHS, WAS THERE A TIME WHEN YOU DID NOT HAVE A STEADY PLACE TO SLEEP OR SLEPT IN A SHELTER (INCLUDING NOW)?: NO

## 2023-02-15 SDOH — ECONOMIC STABILITY: INCOME INSECURITY: HOW HARD IS IT FOR YOU TO PAY FOR THE VERY BASICS LIKE FOOD, HOUSING, MEDICAL CARE, AND HEATING?: NOT HARD AT ALL

## 2023-02-15 SDOH — ECONOMIC STABILITY: FOOD INSECURITY: WITHIN THE PAST 12 MONTHS, YOU WORRIED THAT YOUR FOOD WOULD RUN OUT BEFORE YOU GOT MONEY TO BUY MORE.: NEVER TRUE

## 2023-02-15 SDOH — ECONOMIC STABILITY: FOOD INSECURITY: WITHIN THE PAST 12 MONTHS, THE FOOD YOU BOUGHT JUST DIDN'T LAST AND YOU DIDN'T HAVE MONEY TO GET MORE.: NEVER TRUE

## 2023-02-15 ASSESSMENT — PATIENT HEALTH QUESTIONNAIRE - PHQ9
SUM OF ALL RESPONSES TO PHQ QUESTIONS 1-9: 0
1. LITTLE INTEREST OR PLEASURE IN DOING THINGS: 0
SUM OF ALL RESPONSES TO PHQ QUESTIONS 1-9: 0
SUM OF ALL RESPONSES TO PHQ QUESTIONS 1-9: 0
SUM OF ALL RESPONSES TO PHQ9 QUESTIONS 1 & 2: 0
SUM OF ALL RESPONSES TO PHQ QUESTIONS 1-9: 0
2. FEELING DOWN, DEPRESSED OR HOPELESS: 0
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
## 2023-02-21 ENCOUNTER — TELEPHONE (OUTPATIENT)
Dept: FAMILY MEDICINE CLINIC | Age: 58
End: 2023-02-21

## 2023-02-21 RX ORDER — FLUTICASONE PROPIONATE AND SALMETEROL 500; 50 UG/1; UG/1
1 POWDER RESPIRATORY (INHALATION) EVERY 12 HOURS
Qty: 60 EACH | Refills: 2 | Status: CANCELLED | OUTPATIENT
Start: 2023-02-21

## 2023-02-21 RX ORDER — FLUTICASONE PROPIONATE AND SALMETEROL 250; 50 UG/1; UG/1
1 POWDER RESPIRATORY (INHALATION) EVERY 12 HOURS
Qty: 180 EACH | Refills: 3 | Status: SHIPPED | OUTPATIENT
Start: 2023-02-21

## 2023-02-21 NOTE — TELEPHONE ENCOUNTER
Pt switching pharmacy and the wrong script was ordered. In the past she took 500/50 mcg inhaler one time. HOWEVER SHE ACTUALLY NEEDS 250/50 mcg inhaler that she uses regularly. Please send a new script to the pharmacy.     [facility] ([address]) - [address] [place] [address]

## 2023-03-20 DIAGNOSIS — J30.9 ALLERGIC RHINITIS, UNSPECIFIED SEASONALITY, UNSPECIFIED TRIGGER: ICD-10-CM

## 2023-03-20 DIAGNOSIS — J45.40 MODERATE PERSISTENT ASTHMA WITHOUT COMPLICATION: ICD-10-CM

## 2023-03-20 RX ORDER — MONTELUKAST SODIUM 10 MG/1
TABLET ORAL
Qty: 90 TABLET | Refills: 3 | Status: SHIPPED | OUTPATIENT
Start: 2023-03-20

## 2023-03-20 NOTE — TELEPHONE ENCOUNTER
Requested Prescriptions     Pending Prescriptions Disp Refills    montelukast (SINGULAIR) 10 MG tablet [Pharmacy Med Name: MONTELUKAST SODIUM TABS 10MG] 90 tablet 3     Sig: TAKE 1 TABLET EVERY NIGHT (DUE FOR APPOINTMENT)          Last Office Visit: 2/15/2023     Next Office Visit: Visit date not found     Last Labs: 11/23/22

## 2023-08-29 ENCOUNTER — OFFICE VISIT (OUTPATIENT)
Age: 58
End: 2023-08-29
Payer: COMMERCIAL

## 2023-08-29 ENCOUNTER — TELEPHONE (OUTPATIENT)
Age: 58
End: 2023-08-29

## 2023-08-29 VITALS
BODY MASS INDEX: 32.61 KG/M2 | HEART RATE: 68 BPM | HEIGHT: 64 IN | DIASTOLIC BLOOD PRESSURE: 76 MMHG | WEIGHT: 191 LBS | SYSTOLIC BLOOD PRESSURE: 124 MMHG | OXYGEN SATURATION: 96 % | TEMPERATURE: 97.7 F | RESPIRATION RATE: 14 BRPM

## 2023-08-29 DIAGNOSIS — Z23 NEED FOR PNEUMOCOCCAL 20-VALENT CONJUGATE VACCINATION: ICD-10-CM

## 2023-08-29 DIAGNOSIS — Z12.31 BREAST CANCER SCREENING BY MAMMOGRAM: ICD-10-CM

## 2023-08-29 DIAGNOSIS — K13.0 LIP LESION: ICD-10-CM

## 2023-08-29 DIAGNOSIS — R19.5 HIGH STOOL BILE ACIDS: ICD-10-CM

## 2023-08-29 DIAGNOSIS — E55.9 VITAMIN D DEFICIENCY: ICD-10-CM

## 2023-08-29 DIAGNOSIS — R73.02 IMPAIRED GLUCOSE TOLERANCE: ICD-10-CM

## 2023-08-29 DIAGNOSIS — J45.40 MODERATE PERSISTENT ASTHMA WITHOUT COMPLICATION: ICD-10-CM

## 2023-08-29 DIAGNOSIS — Z00.00 WELL ADULT EXAM: Primary | ICD-10-CM

## 2023-08-29 DIAGNOSIS — R05.3 CHRONIC COUGH: ICD-10-CM

## 2023-08-29 DIAGNOSIS — L90.5 SCAR TISSUE: ICD-10-CM

## 2023-08-29 PROCEDURE — 90471 IMMUNIZATION ADMIN: CPT | Performed by: FAMILY MEDICINE

## 2023-08-29 PROCEDURE — 99396 PREV VISIT EST AGE 40-64: CPT | Performed by: FAMILY MEDICINE

## 2023-08-29 PROCEDURE — 90677 PCV20 VACCINE IM: CPT | Performed by: FAMILY MEDICINE

## 2023-08-29 RX ORDER — MONTELUKAST SODIUM 4 MG/1
2 TABLET, CHEWABLE ORAL 2 TIMES DAILY
Qty: 60 TABLET | Refills: 5 | Status: SHIPPED | OUTPATIENT
Start: 2023-08-29

## 2023-08-29 RX ORDER — FLUTICASONE PROPIONATE AND SALMETEROL 500; 50 UG/1; UG/1
1 POWDER RESPIRATORY (INHALATION) EVERY 12 HOURS
Qty: 60 EACH | Refills: 1 | Status: SHIPPED | OUTPATIENT
Start: 2023-08-29

## 2023-08-29 ASSESSMENT — ENCOUNTER SYMPTOMS
CHEST TIGHTNESS: 0
ABDOMINAL PAIN: 0
WHEEZING: 0
CHOKING: 0
VOICE CHANGE: 0
BACK PAIN: 0
TROUBLE SWALLOWING: 0
SORE THROAT: 0
COUGH: 0
NAUSEA: 0
CONSTIPATION: 0
DIARRHEA: 1
ANAL BLEEDING: 0
BLOOD IN STOOL: 0
SHORTNESS OF BREATH: 0

## 2023-10-11 ENCOUNTER — OFFICE VISIT (OUTPATIENT)
Dept: SURGERY | Age: 58
End: 2023-10-11
Payer: COMMERCIAL

## 2023-10-11 VITALS
OXYGEN SATURATION: 98 % | WEIGHT: 195 LBS | HEART RATE: 70 BPM | DIASTOLIC BLOOD PRESSURE: 80 MMHG | SYSTOLIC BLOOD PRESSURE: 124 MMHG | TEMPERATURE: 98.2 F | BODY MASS INDEX: 33.47 KG/M2

## 2023-10-11 DIAGNOSIS — K13.0 LIP LESION: Primary | ICD-10-CM

## 2023-10-11 PROCEDURE — 99203 OFFICE O/P NEW LOW 30 MIN: CPT | Performed by: SURGERY

## 2023-10-20 ENCOUNTER — HOSPITAL ENCOUNTER (OUTPATIENT)
Dept: CT IMAGING | Age: 58
Discharge: HOME | End: 2023-10-20
Attending: SURGERY
Payer: COMMERCIAL

## 2023-10-20 DIAGNOSIS — K13.0 LIP LESION: ICD-10-CM

## 2023-10-20 PROCEDURE — 70486 CT MAXILLOFACIAL W/O DYE: CPT

## 2023-10-24 ENCOUNTER — TELEPHONE (OUTPATIENT)
Dept: SURGERY | Age: 58
End: 2023-10-24

## 2023-10-24 NOTE — TELEPHONE ENCOUNTER
----- Message from Frandy Andrade MD sent at 10/24/2023  9:32 AM EDT -----  No foreign body seen. Thanks!   NK

## 2023-10-24 NOTE — TELEPHONE ENCOUNTER
Patient notified of results wants to know what next step is? She still has what feels like blisters or knots like on her lips ?

## 2023-10-24 NOTE — TELEPHONE ENCOUNTER
Informed patient about scare massage on lips and she understood and will try that and contact us if needed

## 2023-10-24 NOTE — TELEPHONE ENCOUNTER
[facility] [employer]    Would recommend aggressive scar massage to the lips and if there is still no improvement after 6-8 weeks, then she can come back and I'll discuss the surgial intervention. Ideally, would prefer to not operate to limit additional scarring if avoidable. Thanks!   NK

## 2023-12-05 DIAGNOSIS — J30.9 ALLERGIC RHINITIS, UNSPECIFIED SEASONALITY, UNSPECIFIED TRIGGER: ICD-10-CM

## 2023-12-05 DIAGNOSIS — J45.40 MODERATE PERSISTENT ASTHMA WITHOUT COMPLICATION: ICD-10-CM

## 2023-12-06 RX ORDER — MONTELUKAST SODIUM 10 MG/1
TABLET ORAL
Qty: 90 TABLET | Refills: 3 | Status: SHIPPED | OUTPATIENT
Start: 2023-12-06

## 2023-12-06 NOTE — TELEPHONE ENCOUNTER
Requested Prescriptions     Pending Prescriptions Disp Refills    montelukast (SINGULAIR) 10 MG tablet [Pharmacy Med Name: Montelukast Sodium 10 MG Oral Tablet] 90 tablet 3     Sig: TAKE 1 TABLET BY MOUTH AT NIGHT  (DUE FOR APPOINTMENT)         Last OV: 8/29/2023    Last labs: 8/29/2023     F/u: no

## 2024-02-05 ENCOUNTER — PATIENT MESSAGE (OUTPATIENT)
Age: 59
End: 2024-02-05

## 2024-02-05 ENCOUNTER — OFFICE VISIT (OUTPATIENT)
Age: 59
End: 2024-02-05
Payer: COMMERCIAL

## 2024-02-05 VITALS
SYSTOLIC BLOOD PRESSURE: 102 MMHG | OXYGEN SATURATION: 94 % | RESPIRATION RATE: 16 BRPM | BODY MASS INDEX: 33.44 KG/M2 | TEMPERATURE: 98.1 F | HEART RATE: 103 BPM | WEIGHT: 194.8 LBS | DIASTOLIC BLOOD PRESSURE: 68 MMHG

## 2024-02-05 DIAGNOSIS — Z12.31 ENCOUNTER FOR SCREENING MAMMOGRAM FOR MALIGNANT NEOPLASM OF BREAST: ICD-10-CM

## 2024-02-05 DIAGNOSIS — J02.9 SORE THROAT: ICD-10-CM

## 2024-02-05 DIAGNOSIS — L30.9 ECZEMA, UNSPECIFIED TYPE: ICD-10-CM

## 2024-02-05 DIAGNOSIS — J45.41 MODERATE PERSISTENT ASTHMA WITH EXACERBATION: Primary | ICD-10-CM

## 2024-02-05 DIAGNOSIS — H10.33 ACUTE BACTERIAL CONJUNCTIVITIS OF BOTH EYES: ICD-10-CM

## 2024-02-05 LAB — STREPTOCOCCUS A RNA: NORMAL

## 2024-02-05 PROCEDURE — 99213 OFFICE O/P EST LOW 20 MIN: CPT | Performed by: FAMILY MEDICINE

## 2024-02-05 PROCEDURE — 87651 STREP A DNA AMP PROBE: CPT | Performed by: FAMILY MEDICINE

## 2024-02-05 RX ORDER — METHYLPREDNISOLONE 4 MG/1
TABLET ORAL
Qty: 1 KIT | Refills: 0 | Status: SHIPPED | OUTPATIENT
Start: 2024-02-05

## 2024-02-05 RX ORDER — MOXIFLOXACIN 5 MG/ML
1 SOLUTION/ DROPS OPHTHALMIC 3 TIMES DAILY
Qty: 3 ML | Refills: 0 | Status: SHIPPED | OUTPATIENT
Start: 2024-02-05 | End: 2024-02-12

## 2024-02-05 RX ORDER — ALBUTEROL SULFATE 90 UG/1
2 POWDER, METERED RESPIRATORY (INHALATION) EVERY 4 HOURS PRN
Qty: 3 EACH | Refills: 0 | Status: SHIPPED | OUTPATIENT
Start: 2024-02-05

## 2024-02-05 RX ORDER — AZITHROMYCIN 250 MG/1
TABLET, FILM COATED ORAL
Qty: 1 PACKET | Refills: 0 | Status: SHIPPED | OUTPATIENT
Start: 2024-02-05 | End: 2024-02-15

## 2024-02-05 ASSESSMENT — PATIENT HEALTH QUESTIONNAIRE - PHQ9
1. LITTLE INTEREST OR PLEASURE IN DOING THINGS: 1
2. FEELING DOWN, DEPRESSED OR HOPELESS: 0
SUM OF ALL RESPONSES TO PHQ QUESTIONS 1-9: 1
SUM OF ALL RESPONSES TO PHQ9 QUESTIONS 1 & 2: 1
SUM OF ALL RESPONSES TO PHQ QUESTIONS 1-9: 1

## 2024-02-05 NOTE — TELEPHONE ENCOUNTER
From: Barbie Adkins  To: Dr. Winsome Tineo  Sent: 2/5/2024 10:31 AM EST  Subject: Fever for days    I need some help. Have had a fever between 99.8 and 101 since last Tuesday or Weds. Advil seemed to help but it never completely goes away and comes right back. I have sore throat coughing loads of mucus coming up. I have been talking musinex and Advil. Also using Netipot daily. I found some relief with these. It never lasts. Now I have thick yellowish puss coming out my eyes mostly after waking up. Right more than left eye.   If you recommend a prescription I can’t use [facility] anymore. Please send to [facility] pharmacy [facility] Ad[facility]

## 2024-02-05 NOTE — PROGRESS NOTES
Subjective:      Patient ID: Barbie JIANG Jed 58 y.o. female. is here for evaluation for fever and ST      HPI    7 days temp to 101, ST, laryngitis, cough.  Cough is productive green mucous.  Worse over the weekend.  Chest is tight, is hard to use inhaler.  Using her rescue inhaler before her Advair was helping but stopped working over the weekend.  Using rescue inhaler twice a day.  Chest pain anterior, pressure.   Scant discharge from nose, green.  Appetite is decreased. No nausea, vomiting, diarrhea   Also notes yellow discharge from both eyes.   Rx; Delsym and Mucinex are helping minimally.   She has no known exposure and has not tested for strep.    Eczema forehead, upper cheeks, lower eye lids.  Flared with recent cold. Using olive oil based moisturizer; helps some.  Hytone effective previously.     Outpatient Medications Marked as Taking for the 2/5/24 encounter (Office Visit) with Winsome Tineo MD   Medication Sig Dispense Refill    montelukast (SINGULAIR) 10 MG tablet TAKE 1 TABLET BY MOUTH AT NIGHT  (DUE FOR APPOINTMENT) 90 tablet 3    colestipol (COLESTID) 1 g tablet Take 1 tablet by mouth 2 times daily 180 tablet 1    Amylase-Lipase-Protease (PANCREASE PO) Take 1 tablet by mouth daily      fluticasone-salmeterol (ADVAIR DISKUS) 250-50 MCG/ACT AEPB diskus inhaler Inhale 1 puff into the lungs in the morning and 1 puff in the evening. 180 each 3    hydrocortisone 2.5 % cream Apply topically 2 times daily Apply topically 2 times daily. 15 g 2    albuterol sulfate HFA (PROVENTIL HFA) 108 (90 Base) MCG/ACT inhaler Inhale 2 puffs into the lungs every 6 hours as needed for Wheezing or Shortness of Breath 1 each 2    fluticasone (FLONASE) 50 MCG/ACT nasal spray USE 2 SPRAYS NASALLY EVERY DAY 3 Bottle 3    fexofenadine (ALLEGRA ALLERGY) 60 MG tablet Take 1 tablet by mouth daily 1 tablet 2    vitamin D (CHOLECALCIFEROL) 1000 UNIT TABS tablet Take 5 tablets by mouth daily      Probiotic Product (MORELAND COLON

## 2024-02-15 RX ORDER — FLUTICASONE PROPIONATE AND SALMETEROL 250; 50 UG/1; UG/1
POWDER RESPIRATORY (INHALATION)
Qty: 180 EACH | Refills: 3 | Status: SHIPPED | OUTPATIENT
Start: 2024-02-15

## 2024-02-15 NOTE — TELEPHONE ENCOUNTER
Requested Prescriptions     Pending Prescriptions Disp Refills    fluticasone-salmeterol (ADVAIR) 250-50 MCG/ACT AEPB diskus inhaler [Pharmacy Med Name: FLUTICASONE  250MCG 50MCG INHALANT POWDER  SALMET DIS] 180 each 3     Sig: INHALE 1 INHALATION BY MOUTH  INTO THE LUNGS IN THE MORNING  AND 1 INHALATION BY MOUTH IN THE EVENING         Last OV: 2/5/2024    Last labs: 8/29/2023     F/u: Visit date not found

## 2024-02-23 ENCOUNTER — OFFICE VISIT (OUTPATIENT)
Age: 59
End: 2024-02-23
Payer: COMMERCIAL

## 2024-02-23 VITALS
DIASTOLIC BLOOD PRESSURE: 72 MMHG | BODY MASS INDEX: 33.06 KG/M2 | WEIGHT: 192.6 LBS | HEART RATE: 90 BPM | TEMPERATURE: 98.4 F | OXYGEN SATURATION: 97 % | RESPIRATION RATE: 16 BRPM | SYSTOLIC BLOOD PRESSURE: 114 MMHG

## 2024-02-23 DIAGNOSIS — J45.41 MODERATE PERSISTENT ASTHMA WITH EXACERBATION: ICD-10-CM

## 2024-02-23 DIAGNOSIS — J01.00 ACUTE MAXILLARY SINUSITIS, RECURRENCE NOT SPECIFIED: Primary | ICD-10-CM

## 2024-02-23 PROCEDURE — 99213 OFFICE O/P EST LOW 20 MIN: CPT | Performed by: FAMILY MEDICINE

## 2024-02-23 RX ORDER — DOXYCYCLINE HYCLATE 100 MG
100 TABLET ORAL 2 TIMES DAILY
Qty: 20 TABLET | Refills: 0 | Status: SHIPPED | OUTPATIENT
Start: 2024-02-23 | End: 2024-03-04

## 2024-02-23 SDOH — ECONOMIC STABILITY: INCOME INSECURITY: HOW HARD IS IT FOR YOU TO PAY FOR THE VERY BASICS LIKE FOOD, HOUSING, MEDICAL CARE, AND HEATING?: NOT HARD AT ALL

## 2024-02-23 SDOH — ECONOMIC STABILITY: FOOD INSECURITY: WITHIN THE PAST 12 MONTHS, THE FOOD YOU BOUGHT JUST DIDN'T LAST AND YOU DIDN'T HAVE MONEY TO GET MORE.: NEVER TRUE

## 2024-02-23 NOTE — PROGRESS NOTES
Patient is here for post nasal drip and cough .  Cough is clear / cloudy white/yellow.  Sleeping relatively okay once asleep.  Irritated throat . Her sore throat is worse in am and late pm. Slight hoarseness.  No fever.  No headaches .  Minimal ear pressure / popping. No shortness of breath or wheezing.   She was doing better and over the last 2 days increased symptoms.  Some fatigue still but less than prior fatigue.  She is s/p Azithromycin / Zpak and Medrol Dosepak.  She did miss a few Allegra doses, but took Zpak as prescribed.      Review of Systems    ROS: All other systems were reviewed and are negative .  Patient's allergies and medications were reviewed.  Patient's past medical, surgical, social , and family history were reviewed.    OBJECTIVE:  /72 (Site: Left Upper Arm, Position: Sitting)   Pulse 90   Temp 98.4 °F (36.9 °C) (Temporal)   Resp 16   Wt 87.4 kg (192 lb 9.6 oz)   SpO2 97%   BMI 33.06 kg/m²     Physical Exam    General: NAD, cooperative, alert and oriented X 3. Mood / affect is good.good insight. well hydrated.  HEENT: PERRLA, EOMI, TMs - clear.  Nasopharynx clear.  Mild maxillary tenderness.   Neck : no lymphadenopathy, supple, FROM  CV: Regular rate and rhythm , no murmurs/ rub/ gallop. No edema.   Lungs : CTA bilaterally, breathing comfortably  Abdomen: positive bowel sounds, soft , non tender, non distended. No hepatosplenomegaly. No CVA tenderness.  Skin: no rashes. Non tender.     ASSESSMENT/  PLAN:  1. Acute maxillary sinusitis, recurrence not specified  - Push fluids - water. Netti pot prn.    - doxycycline hyclate (VIBRA-TABS) 100 MG tablet; Take 1 tablet by mouth 2 times daily for 10 days  Dispense: 20 tablet; Refill: 0    2. Moderate persistent asthma with exacerbation  - Continue Advair bid and Albutero HFA prn.    - Continue Singulair 10 mg qd.     F/u if no improvement 7-10d/ prn increased symptoms.

## 2024-04-02 DIAGNOSIS — R19.5 HIGH STOOL BILE ACIDS: ICD-10-CM

## 2024-04-02 RX ORDER — MONTELUKAST SODIUM 4 MG/1
1 TABLET, CHEWABLE ORAL 2 TIMES DAILY
Qty: 180 TABLET | Refills: 1 | Status: SHIPPED | OUTPATIENT
Start: 2024-04-02

## 2024-04-02 NOTE — TELEPHONE ENCOUNTER
Requested Prescriptions     Pending Prescriptions Disp Refills    colestipol (COLESTID) 1 g tablet [Pharmacy Med Name: Colestipol HCl 1 GM Oral Tablet] 180 tablet 3     Sig: TAKE 1 TABLET BY MOUTH TWICE  DAILY         Last OV: 2/23/2024    Last labs: 11/23/2020     F/u: Visit date not found

## 2024-05-14 ENCOUNTER — APPOINTMENT (RX ONLY)
Dept: URBAN - METROPOLITAN AREA CLINIC 170 | Facility: CLINIC | Age: 59
Setting detail: DERMATOLOGY
End: 2024-05-14

## 2024-05-14 DIAGNOSIS — L21.8 OTHER SEBORRHEIC DERMATITIS: ICD-10-CM | Status: INADEQUATELY CONTROLLED

## 2024-05-14 PROCEDURE — ? MDM - TREATMENT GOALS

## 2024-05-14 PROCEDURE — 99204 OFFICE O/P NEW MOD 45 MIN: CPT

## 2024-05-14 PROCEDURE — ? EDUCATIONAL RESOURCES PROVIDED

## 2024-05-14 PROCEDURE — ? PRESCRIPTION MEDICATION MANAGEMENT

## 2024-05-14 PROCEDURE — ? PRESCRIPTION

## 2024-05-14 PROCEDURE — ? COUNSELING

## 2024-05-14 RX ORDER — KETOCONAZOLE 20 MG/G
CREAM TOPICAL
Qty: 60 | Refills: 11 | Status: ERX | COMMUNITY
Start: 2024-05-14

## 2024-05-14 RX ADMIN — KETOCONAZOLE: 20 CREAM TOPICAL at 00:00

## 2024-05-14 ASSESSMENT — LOCATION DETAILED DESCRIPTION DERM: LOCATION DETAILED: RIGHT INFERIOR MEDIAL MALAR CHEEK

## 2024-05-14 ASSESSMENT — LOCATION SIMPLE DESCRIPTION DERM: LOCATION SIMPLE: RIGHT CHEEK

## 2024-05-14 ASSESSMENT — LOCATION ZONE DERM: LOCATION ZONE: FACE

## 2024-05-14 NOTE — HPI: RASH
What Type Of Note Output Would You Prefer (Optional)?: Bullet Format
How Severe Is Your Rash?: moderate
Is This A New Presentation, Or A Follow-Up?: Rash
Additional History: Patient went to primary doctor prescribed Medrol dose pack, hydrocortisone, Antihistamines

## 2024-05-14 NOTE — PROCEDURE: PRESCRIPTION MEDICATION MANAGEMENT
Continue Regimen: OTC nizoral shampoo, 2.5% hydrocortisone as needed
Detail Level: Detailed
Render In Strict Bullet Format?: No
Initiate Treatment: Ketoconazole cream daily to face

## 2024-10-08 DIAGNOSIS — R19.5 HIGH STOOL BILE ACIDS: ICD-10-CM

## 2024-10-09 RX ORDER — MONTELUKAST SODIUM 4 MG/1
1 TABLET, CHEWABLE ORAL 2 TIMES DAILY
Qty: 180 TABLET | Refills: 3 | OUTPATIENT
Start: 2024-10-09

## 2024-10-26 DIAGNOSIS — J30.9 ALLERGIC RHINITIS, UNSPECIFIED SEASONALITY, UNSPECIFIED TRIGGER: ICD-10-CM

## 2024-10-26 DIAGNOSIS — J45.40 MODERATE PERSISTENT ASTHMA WITHOUT COMPLICATION: ICD-10-CM

## 2024-10-28 RX ORDER — MONTELUKAST SODIUM 10 MG/1
TABLET ORAL
Qty: 90 TABLET | Refills: 1 | Status: SHIPPED | OUTPATIENT
Start: 2024-10-28

## 2024-11-04 DIAGNOSIS — R19.5 HIGH STOOL BILE ACIDS: ICD-10-CM

## 2024-11-04 DIAGNOSIS — E05.90 HYPERTHYROIDISM: ICD-10-CM

## 2024-11-04 DIAGNOSIS — R05.3 CHRONIC COUGH: ICD-10-CM

## 2024-11-04 DIAGNOSIS — Z00.00 WELL ADULT EXAM: Primary | ICD-10-CM

## 2024-11-04 DIAGNOSIS — E55.9 VITAMIN D DEFICIENCY: ICD-10-CM

## 2024-11-04 RX ORDER — MONTELUKAST SODIUM 4 MG/1
1 TABLET, CHEWABLE ORAL 2 TIMES DAILY
Qty: 180 TABLET | Refills: 0 | Status: SHIPPED | OUTPATIENT
Start: 2024-11-04

## 2024-11-04 NOTE — TELEPHONE ENCOUNTER
Requested Prescriptions     Pending Prescriptions Disp Refills    colestipol (COLESTID) 1 g tablet [Pharmacy Med Name: Colestipol HCl 1 GM Oral Tablet] 180 tablet 3     Sig: TAKE 1 TABLET BY MOUTH TWICE  DAILY         Last OV: 2/5/2024    Last labs: 11/23/2020     F/u: Visit date not found

## 2024-12-29 DIAGNOSIS — R19.5 HIGH STOOL BILE ACIDS: ICD-10-CM

## 2024-12-30 RX ORDER — COLESTIPOL HYDROCHLORIDE 1 G/1
1 TABLET ORAL 2 TIMES DAILY
Qty: 180 TABLET | Refills: 0 | Status: SHIPPED | OUTPATIENT
Start: 2024-12-30

## 2025-01-02 RX ORDER — FLUTICASONE PROPIONATE AND SALMETEROL 250; 50 UG/1; UG/1
POWDER RESPIRATORY (INHALATION)
Qty: 180 EACH | Refills: 3 | OUTPATIENT
Start: 2025-01-02

## 2025-01-21 DIAGNOSIS — R05.3 CHRONIC COUGH: ICD-10-CM

## 2025-01-21 DIAGNOSIS — Z00.00 WELL ADULT EXAM: ICD-10-CM

## 2025-01-21 DIAGNOSIS — E55.9 VITAMIN D DEFICIENCY: ICD-10-CM

## 2025-01-22 LAB
25(OH)D3 SERPL-MCNC: 47.9 NG/ML
ALBUMIN SERPL-MCNC: 4.6 G/DL (ref 3.4–5)
ALBUMIN/GLOB SERPL: 1.8 {RATIO} (ref 1.1–2.2)
ALP SERPL-CCNC: 94 U/L (ref 40–129)
ALT SERPL-CCNC: 31 U/L (ref 10–40)
ANION GAP SERPL CALCULATED.3IONS-SCNC: 12 MMOL/L (ref 3–16)
AST SERPL-CCNC: 19 U/L (ref 15–37)
BILIRUB SERPL-MCNC: 0.3 MG/DL (ref 0–1)
BUN SERPL-MCNC: 12 MG/DL (ref 7–20)
CALCIUM SERPL-MCNC: 9.5 MG/DL (ref 8.3–10.6)
CHLORIDE SERPL-SCNC: 104 MMOL/L (ref 99–110)
CHOLEST SERPL-MCNC: 142 MG/DL (ref 0–199)
CO2 SERPL-SCNC: 24 MMOL/L (ref 21–32)
CREAT SERPL-MCNC: 0.6 MG/DL (ref 0.6–1.1)
DEPRECATED RDW RBC AUTO: 15.5 % (ref 12.4–15.4)
EST. AVERAGE GLUCOSE BLD GHB EST-MCNC: 116.9 MG/DL
GFR SERPLBLD CREATININE-BSD FMLA CKD-EPI: >90 ML/MIN/{1.73_M2}
GLUCOSE SERPL-MCNC: 98 MG/DL (ref 70–99)
HBA1C MFR BLD: 5.7 %
HCT VFR BLD AUTO: 42.4 % (ref 36–48)
HCV AB SERPL QL IA: NORMAL
HDLC SERPL-MCNC: 66 MG/DL (ref 40–60)
HGB BLD-MCNC: 13.8 G/DL (ref 12–16)
LDLC SERPL CALC-MCNC: 64 MG/DL
MCH RBC QN AUTO: 27.2 PG (ref 26–34)
MCHC RBC AUTO-ENTMCNC: 32.6 G/DL (ref 31–36)
MCV RBC AUTO: 83.6 FL (ref 80–100)
PLATELET # BLD AUTO: 374 K/UL (ref 135–450)
PMV BLD AUTO: 8 FL (ref 5–10.5)
POTASSIUM SERPL-SCNC: 4.4 MMOL/L (ref 3.5–5.1)
PROT SERPL-MCNC: 7.2 G/DL (ref 6.4–8.2)
RBC # BLD AUTO: 5.07 M/UL (ref 4–5.2)
SODIUM SERPL-SCNC: 140 MMOL/L (ref 136–145)
TRIGL SERPL-MCNC: 58 MG/DL (ref 0–150)
TSH SERPL DL<=0.005 MIU/L-ACNC: 1.81 UIU/ML (ref 0.27–4.2)
VLDLC SERPL CALC-MCNC: 12 MG/DL
WBC # BLD AUTO: 8.6 K/UL (ref 4–11)

## 2025-01-23 LAB — TRYPTASE SERPL-MCNC: 6.9 UG/L

## 2025-03-26 DIAGNOSIS — J45.40 MODERATE PERSISTENT ASTHMA WITHOUT COMPLICATION: ICD-10-CM

## 2025-03-26 DIAGNOSIS — J30.9 ALLERGIC RHINITIS, UNSPECIFIED SEASONALITY, UNSPECIFIED TRIGGER: ICD-10-CM

## 2025-03-26 DIAGNOSIS — R19.5 HIGH STOOL BILE ACIDS: ICD-10-CM

## 2025-03-26 PROBLEM — D12.6 TUBULAR ADENOMA OF COLON: Status: ACTIVE | Noted: 2025-03-26

## 2025-03-26 NOTE — PROGRESS NOTES
Subjective:      Patient ID: Barbie Adkins is a 59 y.o. female is here for her annual wellness exam, asthma, migraines.     HPI    PAP without HPV negative 2017.  Never had abnormal PAP.  Has hx cervical polyps.  Dr. Arce  Breast cancer screening: last screened 2017. FH neg for breast cancer  Colon cancer screening: colonoscopy 2023 + tubular adenoma.  3 year follow up recommended.   FH neg for colon cancer/polyps  Vaccines: due shingles, COVID, flu, Hep B  Diet:    Exercise:  walks most day.  Strength training 3 times a week. Upper and lower ext.     Impaired glucose tolerance:  HGA1C 5.7     Vitamin D deficiency: resolved with supplement.  Would like to have Vitamin K level done since takes colestipol    Hashimoto's thyroiditis: recent thyroid labs normal.      Asthma: no hospitalizations.  Had pneumococcal vaccine.  Treated with Advair and Singulair.    Migraines:       The 10-year ASCVD risk score (Suri CHILDERS, et al., 2019) is: 1.4%    Values used to calculate the score:      Age: 59 years      Sex: Female      Is Non- : No      Diabetic: No      Tobacco smoker: No      Systolic Blood Pressure: 116 mmHg      Is BP treated: No      HDL Cholesterol: 66 mg/dL      Total Cholesterol: 142 mg/dL     Health Maintenance   Topic Date Due    HIV screen  Never done    Hepatitis B vaccine (1 of 3 - 19+ 3-dose series) Never done    Cervical cancer screen  Never done    Shingles vaccine (1 of 2) Never done    Breast cancer screen  12/06/2019    Flu vaccine (1) 08/01/2024    COVID-19 Vaccine (3 - 2024-25 season) 09/01/2024    Depression Screen  02/05/2025    A1C test (Diabetic or Prediabetic)  01/21/2026    Colorectal Cancer Screen  04/27/2026    Lipids  01/21/2030    DTaP/Tdap/Td vaccine (3 - Td or Tdap) 09/12/2030    Pneumococcal 50+ years Vaccine  Completed    Hepatitis C screen  Completed    Hepatitis A vaccine  Aged Out    Hib vaccine  Aged Out    Polio vaccine  Aged Out    Meningococcal (ACWY)

## 2025-03-27 RX ORDER — MONTELUKAST SODIUM 10 MG/1
10 TABLET ORAL NIGHTLY
Qty: 90 TABLET | Refills: 3 | OUTPATIENT
Start: 2025-03-27

## 2025-03-27 RX ORDER — COLESTIPOL HYDROCHLORIDE 1 G/1
1 TABLET ORAL 2 TIMES DAILY
Qty: 180 TABLET | Refills: 3 | OUTPATIENT
Start: 2025-03-27

## 2025-04-02 ENCOUNTER — OFFICE VISIT (OUTPATIENT)
Age: 60
End: 2025-04-02
Payer: COMMERCIAL

## 2025-04-02 VITALS
OXYGEN SATURATION: 98 % | TEMPERATURE: 97.3 F | RESPIRATION RATE: 16 BRPM | DIASTOLIC BLOOD PRESSURE: 62 MMHG | SYSTOLIC BLOOD PRESSURE: 116 MMHG | BODY MASS INDEX: 33.61 KG/M2 | HEART RATE: 90 BPM | HEIGHT: 64 IN | WEIGHT: 196.9 LBS

## 2025-04-02 DIAGNOSIS — E55.9 VITAMIN D DEFICIENCY: ICD-10-CM

## 2025-04-02 DIAGNOSIS — R19.5 HIGH STOOL BILE ACIDS: ICD-10-CM

## 2025-04-02 DIAGNOSIS — Z00.00 WELL ADULT EXAM: Primary | ICD-10-CM

## 2025-04-02 DIAGNOSIS — Z12.31 ENCOUNTER FOR SCREENING MAMMOGRAM FOR MALIGNANT NEOPLASM OF BREAST: ICD-10-CM

## 2025-04-02 DIAGNOSIS — G43.909 MIGRAINE WITHOUT STATUS MIGRAINOSUS, NOT INTRACTABLE, UNSPECIFIED MIGRAINE TYPE: ICD-10-CM

## 2025-04-02 DIAGNOSIS — J45.40 MODERATE PERSISTENT ASTHMA WITHOUT COMPLICATION: ICD-10-CM

## 2025-04-02 DIAGNOSIS — R73.02 IMPAIRED GLUCOSE TOLERANCE: ICD-10-CM

## 2025-04-02 DIAGNOSIS — Z23 NEED FOR VACCINATION: ICD-10-CM

## 2025-04-02 DIAGNOSIS — Z01.84 IMMUNITY STATUS TESTING: ICD-10-CM

## 2025-04-02 DIAGNOSIS — Z01.419 CERVICAL SMEAR, AS PART OF ROUTINE GYNECOLOGICAL EXAMINATION: ICD-10-CM

## 2025-04-02 DIAGNOSIS — Z79.899 MEDICATION MANAGEMENT: ICD-10-CM

## 2025-04-02 PROCEDURE — 99396 PREV VISIT EST AGE 40-64: CPT | Performed by: FAMILY MEDICINE

## 2025-04-02 RX ORDER — COLESTIPOL HYDROCHLORIDE 1 G/1
1 TABLET ORAL DAILY
Qty: 90 TABLET | Refills: 0
Start: 2025-04-02

## 2025-04-02 RX ORDER — LOPERAMIDE HYDROCHLORIDE 2 MG/1
2 CAPSULE ORAL 4 TIMES DAILY PRN
COMMUNITY
Start: 2025-04-02 | End: 2025-04-12

## 2025-04-02 RX ORDER — ALBUTEROL SULFATE 90 UG/1
2 AEROSOL, METERED RESPIRATORY (INHALATION) EVERY 6 HOURS PRN
Qty: 18 G | Refills: 2 | Status: SHIPPED | OUTPATIENT
Start: 2025-04-02

## 2025-04-02 RX ORDER — ZINC GLUCONATE 50 MG
50 TABLET ORAL DAILY
COMMUNITY

## 2025-04-02 SDOH — ECONOMIC STABILITY: FOOD INSECURITY: WITHIN THE PAST 12 MONTHS, THE FOOD YOU BOUGHT JUST DIDN'T LAST AND YOU DIDN'T HAVE MONEY TO GET MORE.: NEVER TRUE

## 2025-04-02 SDOH — ECONOMIC STABILITY: FOOD INSECURITY: WITHIN THE PAST 12 MONTHS, YOU WORRIED THAT YOUR FOOD WOULD RUN OUT BEFORE YOU GOT MONEY TO BUY MORE.: NEVER TRUE

## 2025-04-02 ASSESSMENT — ENCOUNTER SYMPTOMS
BLOOD IN STOOL: 0
DIARRHEA: 1
CHEST TIGHTNESS: 0
ANAL BLEEDING: 0
NAUSEA: 0
VOICE CHANGE: 0
TROUBLE SWALLOWING: 0
SORE THROAT: 0
SHORTNESS OF BREATH: 0
ABDOMINAL PAIN: 0
WHEEZING: 0
CHOKING: 1
BACK PAIN: 0
COLOR CHANGE: 0
CONSTIPATION: 0

## 2025-04-02 ASSESSMENT — PATIENT HEALTH QUESTIONNAIRE - PHQ9
2. FEELING DOWN, DEPRESSED OR HOPELESS: NOT AT ALL
SUM OF ALL RESPONSES TO PHQ QUESTIONS 1-9: 0
1. LITTLE INTEREST OR PLEASURE IN DOING THINGS: NOT AT ALL
SUM OF ALL RESPONSES TO PHQ QUESTIONS 1-9: 0

## 2025-04-03 LAB
HPV HR 12 DNA SPEC QL NAA+PROBE: NOT DETECTED
HPV16 DNA SPEC QL NAA+PROBE: NOT DETECTED
HPV16+18+H RISK 12 DNA SPEC-IMP: NORMAL
HPV18 DNA SPEC QL NAA+PROBE: NOT DETECTED

## 2025-04-09 ENCOUNTER — RESULTS FOLLOW-UP (OUTPATIENT)
Age: 60
End: 2025-04-09

## 2025-04-09 ENCOUNTER — TELEPHONE (OUTPATIENT)
Age: 60
End: 2025-04-09

## 2025-04-09 RX ORDER — ALBUTEROL SULFATE 90 UG/1
2 AEROSOL, METERED RESPIRATORY (INHALATION) EVERY 6 HOURS PRN
Qty: 18 G | Refills: 2 | Status: SHIPPED | OUTPATIENT
Start: 2025-04-09

## 2025-04-09 NOTE — TELEPHONE ENCOUNTER
Received fax from [facility] stating that pt's PROAIR  (90 bas) MCG/ACT inhaler is no longer being made by the  and that a replacement medication is needed. No other medications were recommended for replacement.

## 2025-04-10 ENCOUNTER — PATIENT MESSAGE (OUTPATIENT)
Age: 60
End: 2025-04-10

## 2025-04-11 RX ORDER — ALBUTEROL SULFATE 90 UG/1
2 INHALANT RESPIRATORY (INHALATION) EVERY 4 HOURS PRN
Qty: 18 G | Refills: 2 | Status: SHIPPED | OUTPATIENT
Start: 2025-04-11

## 2025-05-13 ENCOUNTER — APPOINTMENT (OUTPATIENT)
Dept: URBAN - METROPOLITAN AREA CLINIC 170 | Facility: CLINIC | Age: 60
Setting detail: DERMATOLOGY
End: 2025-05-13

## 2025-05-13 DIAGNOSIS — L81.4 OTHER MELANIN HYPERPIGMENTATION: ICD-10-CM | Status: STABLE

## 2025-05-13 DIAGNOSIS — L82.1 OTHER SEBORRHEIC KERATOSIS: ICD-10-CM | Status: STABLE

## 2025-05-13 DIAGNOSIS — D22 MELANOCYTIC NEVI: ICD-10-CM | Status: STABLE

## 2025-05-13 DIAGNOSIS — D18.0 HEMANGIOMA: ICD-10-CM | Status: STABLE

## 2025-05-13 DIAGNOSIS — L21.8 OTHER SEBORRHEIC DERMATITIS: ICD-10-CM | Status: WELL CONTROLLED

## 2025-05-13 PROBLEM — D22.5 MELANOCYTIC NEVI OF TRUNK: Status: ACTIVE | Noted: 2025-05-13

## 2025-05-13 PROBLEM — D18.01 HEMANGIOMA OF SKIN AND SUBCUTANEOUS TISSUE: Status: ACTIVE | Noted: 2025-05-13

## 2025-05-13 PROCEDURE — ? COUNSELING

## 2025-05-13 PROCEDURE — ? FULL BODY SKIN EXAM

## 2025-05-13 PROCEDURE — 99213 OFFICE O/P EST LOW 20 MIN: CPT

## 2025-05-13 PROCEDURE — ? PRESCRIPTION

## 2025-05-13 PROCEDURE — ? TREATMENT REGIMEN

## 2025-05-13 PROCEDURE — ? MDM - TREATMENT GOALS

## 2025-05-13 PROCEDURE — ? PRESCRIPTION MEDICATION MANAGEMENT

## 2025-05-13 RX ORDER — KETOCONAZOLE 20 MG/G
CREAM TOPICAL BID
Qty: 60 | Refills: 11 | Status: ERX

## 2025-05-13 ASSESSMENT — LOCATION ZONE DERM
LOCATION ZONE: FACE
LOCATION ZONE: TRUNK

## 2025-05-13 ASSESSMENT — LOCATION SIMPLE DESCRIPTION DERM
LOCATION SIMPLE: RIGHT CHEEK
LOCATION SIMPLE: RIGHT UPPER BACK
LOCATION SIMPLE: LEFT LOWER BACK
LOCATION SIMPLE: ABDOMEN

## 2025-05-13 ASSESSMENT — LOCATION DETAILED DESCRIPTION DERM
LOCATION DETAILED: EPIGASTRIC SKIN
LOCATION DETAILED: RIGHT MID-UPPER BACK
LOCATION DETAILED: RIGHT INFERIOR MEDIAL MALAR CHEEK
LOCATION DETAILED: PERIUMBILICAL SKIN
LOCATION DETAILED: LEFT SUPERIOR MEDIAL MIDBACK

## 2025-05-13 ASSESSMENT — SEVERITY ASSESSMENT: HOW SEVERE IS THIS PATIENT'S CONDITION?: ALMOST CLEAR

## 2025-05-13 NOTE — PROCEDURE: PRESCRIPTION MEDICATION MANAGEMENT
Continue Regimen: OTC nizoral shampoo, 2.5% hydrocortisone as needed, Ketoconazole cream daily to face as needed
Detail Level: Detailed
Render In Strict Bullet Format?: No

## 2025-05-21 RX ORDER — ALBUTEROL SULFATE 90 UG/1
INHALANT RESPIRATORY (INHALATION)
Qty: 20.1 G | Refills: 2 | Status: SHIPPED | OUTPATIENT
Start: 2025-05-21

## 2025-05-21 NOTE — TELEPHONE ENCOUNTER
Requested Prescriptions     Pending Prescriptions Disp Refills    albuterol sulfate HFA (PROVENTIL;VENTOLIN;PROAIR) 108 (90 Base) MCG/ACT inhaler [Pharmacy Med Name: ALBUTEROL HFA 90MCG/ACT (PV)] 20.1 g 6     Sig: USE 2 INHALATIONS BY MOUTH EVERY 4 HOURS AS NEEDED FOR WHEEZING         Last OV: 4/2/2025    Last labs: 1/21/2025     F/u: Visit date not found

## 2025-07-09 DIAGNOSIS — R19.5 HIGH STOOL BILE ACIDS: ICD-10-CM

## 2025-07-10 RX ORDER — COLESTIPOL HYDROCHLORIDE 1 G/1
1 TABLET ORAL DAILY
Qty: 90 TABLET | Refills: 3 | Status: SHIPPED | OUTPATIENT
Start: 2025-07-10

## 2025-07-10 NOTE — TELEPHONE ENCOUNTER
Requested Prescriptions     Pending Prescriptions Disp Refills    colestipol (COLESTID) 1 g tablet 90 tablet 0     Sig: Take 1 tablet by mouth daily      Last Visit: 4/2/25    Last Labs: 1/21/25    Next Visit: None